# Patient Record
Sex: MALE | Race: OTHER | HISPANIC OR LATINO
[De-identification: names, ages, dates, MRNs, and addresses within clinical notes are randomized per-mention and may not be internally consistent; named-entity substitution may affect disease eponyms.]

---

## 2018-12-04 ENCOUNTER — TRANSCRIPTION ENCOUNTER (OUTPATIENT)
Age: 53
End: 2018-12-04

## 2018-12-10 ENCOUNTER — APPOINTMENT (OUTPATIENT)
Dept: PHYSICAL MEDICINE AND REHAB | Facility: CLINIC | Age: 53
End: 2018-12-10
Payer: COMMERCIAL

## 2018-12-10 VITALS
HEART RATE: 75 BPM | WEIGHT: 168 LBS | BODY MASS INDEX: 25.46 KG/M2 | HEIGHT: 68 IN | OXYGEN SATURATION: 90 % | RESPIRATION RATE: 16 BRPM

## 2018-12-10 DIAGNOSIS — M54.5 LOW BACK PAIN: ICD-10-CM

## 2018-12-10 DIAGNOSIS — Z82.62 FAMILY HISTORY OF OSTEOPOROSIS: ICD-10-CM

## 2018-12-10 DIAGNOSIS — Z86.39 PERSONAL HISTORY OF OTHER ENDOCRINE, NUTRITIONAL AND METABOLIC DISEASE: ICD-10-CM

## 2018-12-10 PROBLEM — Z00.00 ENCOUNTER FOR PREVENTIVE HEALTH EXAMINATION: Status: ACTIVE | Noted: 2018-12-10

## 2018-12-10 PROCEDURE — 99204 OFFICE O/P NEW MOD 45 MIN: CPT

## 2021-07-02 ENCOUNTER — TRANSCRIPTION ENCOUNTER (OUTPATIENT)
Age: 56
End: 2021-07-02

## 2021-08-13 ENCOUNTER — TRANSCRIPTION ENCOUNTER (OUTPATIENT)
Age: 56
End: 2021-08-13

## 2021-09-16 ENCOUNTER — APPOINTMENT (OUTPATIENT)
Dept: UROLOGY | Facility: CLINIC | Age: 56
End: 2021-09-16
Payer: MEDICAID

## 2021-09-16 ENCOUNTER — LABORATORY RESULT (OUTPATIENT)
Age: 56
End: 2021-09-16

## 2021-09-16 VITALS
HEART RATE: 81 BPM | TEMPERATURE: 98.7 F | OXYGEN SATURATION: 95 % | DIASTOLIC BLOOD PRESSURE: 76 MMHG | SYSTOLIC BLOOD PRESSURE: 128 MMHG

## 2021-09-16 DIAGNOSIS — C61 MALIGNANT NEOPLASM OF PROSTATE: ICD-10-CM

## 2021-09-16 PROCEDURE — 99204 OFFICE O/P NEW MOD 45 MIN: CPT

## 2021-09-16 NOTE — ASSESSMENT
[FreeTextEntry1] : Today we discussed the natural history of localized prostate cancer, and the heterogeneous biology of this malignancy.  We discussed the fact that many prostate cancers are slow growing and unlikely to metastasize or cause death, whereas others can be life threatening.  We reviewed risk stratification, staging, Murdock scoring, and PSA levels as they pertain to risk.  \par \par All treatment options were reviewed.  This included active surveillance, androgen deprivation, emerging options such as focal therapy/HIFU/cryotherapy, radiation options (including IMRT, SBRT, brachytherapy) and surgical options (open vs. robotic surgery, single port vs. multi, nerve vs. non-nerve sparing).  Oncologic outcomes were compared and contrasted.  Risks of biochemical and clinical recurrence discussed.  Risks of needing adjuvant or salvage treatments reviewed.  We discussed the risks of secondary malignancy after radiation.  We discussed the opportunity for pathological staging with surgery vs. other options.  We discussed the possibility of positive margins, treatment failure, cancer recurrence and cancer-related death even with treatment. \par \par All potential side effects of treatment were reviewed including, but not limited to: short term or permanent stress urinary incontinence and/or short term or permanent erectile dysfunction, penile shortening, rectal symptoms/pain, perineal pain, and other side effects. \par \par All potential complications of treatment and surgery were reviewed including, but not limited to: risks of conversion from MIS to open surgery discussed, bleeding//life-threatening hemorrhage, rectal injury requiring colostomy or delayed recognition leading to fistula, vascular/bowel/adjacent visceral organ injury, trocar/access injury, the possibility of recognized vs. unrecognized/delayed-recognition injury, risks of thermal/blunt/sharp/retraction injury, risks of DVT, PE, MI, death, risks of cardiopulmonary/anesthesia related complications, positional injury, infection/collection/abscess, wound complications/dehiscence/seroma/cellulitis, urinoma/fistula, ureteral injury/obstruction, bladder neck contracture, penile shortening, meatal stenosis, urethral stricture, lymphocele, obturator nerve injury, prolonged anastomotic leak, and other complications. \par \par We have discussed all of the above with  and they wish to move forward with robotic SP surgery.\par \par \par \par \par \par

## 2021-09-16 NOTE — HISTORY OF PRESENT ILLNESS
[FreeTextEntry1] : CC: prostate cancer\par \par H&P and PE with Samoan telephonic \par \par HPI: \par Patient is a 56 year old man, with elevated PSA 4.5 7/2021 s/p biopsy with GS 6 and 7 referred by Dr. Orville Gallagher. \par \par Patient had urosepsis after biopsy.  \par GS 3+4=7 2/12 (left lateral base and right medial apex) and GS 6 1/12 core (left apex, lateral) \par MRI with 44 c prostate, no definite lesions, MRI at R \par \par No burning or dysuria at this time.   AVSS today in office, afebrile \par Mild ED; erections are ~7 or 8/10 \par Not very sexually active with wife at this time \par \par FAMHX: negative breast, ovarian, prostate \par SURGHX: none \par MEDHX: HTN \par SOCIAL: , no children, nonsmoker \par ROS: Negative 10 system other than feeling a bit weak/tired \par

## 2021-09-17 LAB
ALBUMIN SERPL ELPH-MCNC: 4.7 G/DL
ALP BLD-CCNC: 115 U/L
ALT SERPL-CCNC: 41 U/L
ANION GAP SERPL CALC-SCNC: 20 MMOL/L
APTT BLD: 34.5 SEC
AST SERPL-CCNC: 19 U/L
BASOPHILS # BLD AUTO: 0 K/UL
BASOPHILS NFR BLD AUTO: 0 %
BILIRUB SERPL-MCNC: 1.4 MG/DL
BUN SERPL-MCNC: 17 MG/DL
CALCIUM SERPL-MCNC: 9.8 MG/DL
CHLORIDE SERPL-SCNC: 95 MMOL/L
CO2 SERPL-SCNC: 18 MMOL/L
CREAT SERPL-MCNC: 1.55 MG/DL
EOSINOPHIL # BLD AUTO: 0 K/UL
EOSINOPHIL NFR BLD AUTO: 0 %
GLUCOSE SERPL-MCNC: 163 MG/DL
HCT VFR BLD CALC: 44.9 %
HGB BLD-MCNC: 15.1 G/DL
INR PPP: 1.39 RATIO
LYMPHOCYTES # BLD AUTO: 5.17 K/UL
LYMPHOCYTES NFR BLD AUTO: 17.8 %
MAN DIFF?: NORMAL
MCHC RBC-ENTMCNC: 28.8 PG
MCHC RBC-ENTMCNC: 33.6 GM/DL
MCV RBC AUTO: 85.7 FL
MONOCYTES # BLD AUTO: 2.96 K/UL
MONOCYTES NFR BLD AUTO: 10.2 %
NEUTROPHILS # BLD AUTO: 20.9 K/UL
NEUTROPHILS NFR BLD AUTO: 72 %
PLATELET # BLD AUTO: 219 K/UL
POTASSIUM SERPL-SCNC: 4.1 MMOL/L
PROT SERPL-MCNC: 7.4 G/DL
PT BLD: 16.1 SEC
RBC # BLD: 5.24 M/UL
RBC # FLD: 14.6 %
SODIUM SERPL-SCNC: 133 MMOL/L
WBC # FLD AUTO: 29.03 K/UL

## 2021-09-20 DIAGNOSIS — N39.0 URINARY TRACT INFECTION, SITE NOT SPECIFIED: ICD-10-CM

## 2021-09-20 LAB
APPEARANCE: ABNORMAL
BACTERIA: ABNORMAL
BILIRUBIN URINE: NEGATIVE
BLOOD URINE: ABNORMAL
COLOR: ABNORMAL
GLUCOSE QUALITATIVE U: NEGATIVE
HYALINE CASTS: 0 /LPF
KETONES URINE: NEGATIVE
LEUKOCYTE ESTERASE URINE: ABNORMAL
MICROSCOPIC-UA: NORMAL
NITRITE URINE: NEGATIVE
PH URINE: 5.5
PROTEIN URINE: ABNORMAL
RED BLOOD CELLS URINE: 2 /HPF
SPECIFIC GRAVITY URINE: 1.01
SQUAMOUS EPITHELIAL CELLS: 0 /HPF
UROBILINOGEN URINE: NORMAL
WHITE BLOOD CELLS URINE: 32 /HPF

## 2021-09-21 ENCOUNTER — NON-APPOINTMENT (OUTPATIENT)
Age: 56
End: 2021-09-21

## 2021-09-21 ENCOUNTER — APPOINTMENT (OUTPATIENT)
Dept: UROLOGY | Facility: CLINIC | Age: 56
End: 2021-09-21
Payer: MEDICAID

## 2021-09-21 ENCOUNTER — INPATIENT (INPATIENT)
Facility: HOSPITAL | Age: 56
LOS: 2 days | Discharge: HOME | DRG: 690 | End: 2021-09-24
Attending: INTERNAL MEDICINE | Admitting: INTERNAL MEDICINE
Payer: COMMERCIAL

## 2021-09-21 VITALS
SYSTOLIC BLOOD PRESSURE: 155 MMHG | WEIGHT: 169 LBS | HEART RATE: 93 BPM | OXYGEN SATURATION: 99 % | DIASTOLIC BLOOD PRESSURE: 89 MMHG | TEMPERATURE: 97.6 F | BODY MASS INDEX: 25.61 KG/M2 | HEIGHT: 68 IN

## 2021-09-21 VITALS
TEMPERATURE: 98 F | SYSTOLIC BLOOD PRESSURE: 133 MMHG | DIASTOLIC BLOOD PRESSURE: 87 MMHG | HEIGHT: 68 IN | HEART RATE: 98 BPM | OXYGEN SATURATION: 98 % | WEIGHT: 169.09 LBS | RESPIRATION RATE: 18 BRPM

## 2021-09-21 DIAGNOSIS — Z86.79 PERSONAL HISTORY OF OTHER DISEASES OF THE CIRCULATORY SYSTEM: ICD-10-CM

## 2021-09-21 DIAGNOSIS — Z86.39 PERSONAL HISTORY OF OTHER ENDOCRINE, NUTRITIONAL AND METABOLIC DISEASE: ICD-10-CM

## 2021-09-21 DIAGNOSIS — I10 ESSENTIAL (PRIMARY) HYPERTENSION: ICD-10-CM

## 2021-09-21 DIAGNOSIS — Z00.00 ENCOUNTER FOR GENERAL ADULT MEDICAL EXAMINATION WITHOUT ABNORMAL FINDINGS: ICD-10-CM

## 2021-09-21 DIAGNOSIS — C61 MALIGNANT NEOPLASM OF PROSTATE: ICD-10-CM

## 2021-09-21 DIAGNOSIS — N39.0 URINARY TRACT INFECTION, SITE NOT SPECIFIED: ICD-10-CM

## 2021-09-21 LAB
ALBUMIN SERPL ELPH-MCNC: 4.4 G/DL — SIGNIFICANT CHANGE UP (ref 3.3–5)
ALP SERPL-CCNC: 87 U/L — SIGNIFICANT CHANGE UP (ref 40–120)
ALT FLD-CCNC: 28 U/L — SIGNIFICANT CHANGE UP (ref 10–45)
ANION GAP SERPL CALC-SCNC: 11 MMOL/L — SIGNIFICANT CHANGE UP (ref 5–17)
APPEARANCE UR: CLEAR — SIGNIFICANT CHANGE UP
AST SERPL-CCNC: 17 U/L — SIGNIFICANT CHANGE UP (ref 10–40)
BACTERIA # UR AUTO: ABNORMAL /HPF
BACTERIA UR CULT: ABNORMAL
BASOPHILS # BLD AUTO: 0.04 K/UL — SIGNIFICANT CHANGE UP (ref 0–0.2)
BASOPHILS NFR BLD AUTO: 0.4 % — SIGNIFICANT CHANGE UP (ref 0–2)
BILIRUB SERPL-MCNC: 0.3 MG/DL — SIGNIFICANT CHANGE UP (ref 0.2–1.2)
BILIRUB UR-MCNC: NEGATIVE — SIGNIFICANT CHANGE UP
BUN SERPL-MCNC: 20 MG/DL — SIGNIFICANT CHANGE UP (ref 7–23)
CALCIUM SERPL-MCNC: 10.3 MG/DL — SIGNIFICANT CHANGE UP (ref 8.4–10.5)
CHLORIDE SERPL-SCNC: 101 MMOL/L — SIGNIFICANT CHANGE UP (ref 96–108)
CO2 SERPL-SCNC: 26 MMOL/L — SIGNIFICANT CHANGE UP (ref 22–31)
COLOR SPEC: YELLOW — SIGNIFICANT CHANGE UP
COMMENT - URINE: SIGNIFICANT CHANGE UP
CREAT SERPL-MCNC: 1.27 MG/DL — SIGNIFICANT CHANGE UP (ref 0.5–1.3)
DIFF PNL FLD: ABNORMAL
EOSINOPHIL # BLD AUTO: 0.11 K/UL — SIGNIFICANT CHANGE UP (ref 0–0.5)
EOSINOPHIL NFR BLD AUTO: 1.1 % — SIGNIFICANT CHANGE UP (ref 0–6)
EPI CELLS # UR: SIGNIFICANT CHANGE UP /HPF (ref 0–5)
GLUCOSE SERPL-MCNC: 97 MG/DL — SIGNIFICANT CHANGE UP (ref 70–99)
GLUCOSE UR QL: NEGATIVE — SIGNIFICANT CHANGE UP
HCT VFR BLD CALC: 42.4 % — SIGNIFICANT CHANGE UP (ref 39–50)
HGB BLD-MCNC: 14.1 G/DL — SIGNIFICANT CHANGE UP (ref 13–17)
IMM GRANULOCYTES NFR BLD AUTO: 0.4 % — SIGNIFICANT CHANGE UP (ref 0–1.5)
KETONES UR-MCNC: NEGATIVE — SIGNIFICANT CHANGE UP
LACTATE SERPL-SCNC: 1.2 MMOL/L — SIGNIFICANT CHANGE UP (ref 0.5–2)
LEUKOCYTE ESTERASE UR-ACNC: ABNORMAL
LYMPHOCYTES # BLD AUTO: 1.82 K/UL — SIGNIFICANT CHANGE UP (ref 1–3.3)
LYMPHOCYTES # BLD AUTO: 18.7 % — SIGNIFICANT CHANGE UP (ref 13–44)
MCHC RBC-ENTMCNC: 28.3 PG — SIGNIFICANT CHANGE UP (ref 27–34)
MCHC RBC-ENTMCNC: 33.3 GM/DL — SIGNIFICANT CHANGE UP (ref 32–36)
MCV RBC AUTO: 85 FL — SIGNIFICANT CHANGE UP (ref 80–100)
MONOCYTES # BLD AUTO: 1.21 K/UL — HIGH (ref 0–0.9)
MONOCYTES NFR BLD AUTO: 12.4 % — SIGNIFICANT CHANGE UP (ref 2–14)
NEUTROPHILS # BLD AUTO: 6.52 K/UL — SIGNIFICANT CHANGE UP (ref 1.8–7.4)
NEUTROPHILS NFR BLD AUTO: 67 % — SIGNIFICANT CHANGE UP (ref 43–77)
NITRITE UR-MCNC: POSITIVE
NRBC # BLD: 0 /100 WBCS — SIGNIFICANT CHANGE UP (ref 0–0)
PH UR: 5.5 — SIGNIFICANT CHANGE UP (ref 5–8)
PLATELET # BLD AUTO: 278 K/UL — SIGNIFICANT CHANGE UP (ref 150–400)
POTASSIUM SERPL-MCNC: 4.4 MMOL/L — SIGNIFICANT CHANGE UP (ref 3.5–5.3)
POTASSIUM SERPL-SCNC: 4.4 MMOL/L — SIGNIFICANT CHANGE UP (ref 3.5–5.3)
PROT SERPL-MCNC: 8.6 G/DL — HIGH (ref 6–8.3)
PROT UR-MCNC: ABNORMAL MG/DL
RBC # BLD: 4.99 M/UL — SIGNIFICANT CHANGE UP (ref 4.2–5.8)
RBC # FLD: 13.3 % — SIGNIFICANT CHANGE UP (ref 10.3–14.5)
RBC CASTS # UR COMP ASSIST: ABNORMAL /HPF
SARS-COV-2 RNA SPEC QL NAA+PROBE: NEGATIVE — SIGNIFICANT CHANGE UP
SODIUM SERPL-SCNC: 138 MMOL/L — SIGNIFICANT CHANGE UP (ref 135–145)
SP GR SPEC: >=1.03 — SIGNIFICANT CHANGE UP (ref 1–1.03)
UROBILINOGEN FLD QL: 0.2 E.U./DL — SIGNIFICANT CHANGE UP
WBC # BLD: 9.74 K/UL — SIGNIFICANT CHANGE UP (ref 3.8–10.5)
WBC # FLD AUTO: 9.74 K/UL — SIGNIFICANT CHANGE UP (ref 3.8–10.5)
WBC UR QL: ABNORMAL /HPF

## 2021-09-21 PROCEDURE — 99213 OFFICE O/P EST LOW 20 MIN: CPT

## 2021-09-21 PROCEDURE — 99223 1ST HOSP IP/OBS HIGH 75: CPT | Mod: GC

## 2021-09-21 PROCEDURE — 71045 X-RAY EXAM CHEST 1 VIEW: CPT | Mod: 26

## 2021-09-21 PROCEDURE — 99285 EMERGENCY DEPT VISIT HI MDM: CPT

## 2021-09-21 RX ORDER — MEROPENEM 1 G/30ML
1000 INJECTION INTRAVENOUS EVERY 8 HOURS
Refills: 0 | Status: DISCONTINUED | OUTPATIENT
Start: 2021-09-21 | End: 2021-09-22

## 2021-09-21 RX ORDER — ENOXAPARIN SODIUM 100 MG/ML
40 INJECTION SUBCUTANEOUS EVERY 24 HOURS
Refills: 0 | Status: DISCONTINUED | OUTPATIENT
Start: 2021-09-21 | End: 2021-09-24

## 2021-09-21 RX ORDER — MEROPENEM 1 G/30ML
1000 INJECTION INTRAVENOUS EVERY 8 HOURS
Refills: 0 | Status: DISCONTINUED | OUTPATIENT
Start: 2021-09-22 | End: 2021-09-22

## 2021-09-21 RX ORDER — MEROPENEM 1 G/30ML
1000 INJECTION INTRAVENOUS ONCE
Refills: 0 | Status: COMPLETED | OUTPATIENT
Start: 2021-09-21 | End: 2021-09-21

## 2021-09-21 RX ADMIN — ENOXAPARIN SODIUM 40 MILLIGRAM(S): 100 INJECTION SUBCUTANEOUS at 21:33

## 2021-09-21 RX ADMIN — MEROPENEM 100 MILLIGRAM(S): 1 INJECTION INTRAVENOUS at 16:44

## 2021-09-21 NOTE — ED PROVIDER NOTE - CLINICAL SUMMARY MEDICAL DECISION MAKING FREE TEXT BOX
Pt referred to the ED for WBC and + UA. Afebrile in ED, VSS. No sig wbc or shift, neg lactate, however w/ recent UCx showing ESBL E coli, previously tx'd w/ Ertapenem. Will change to Meropenem for broader coverage, IVF, admit to medicine.

## 2021-09-21 NOTE — HISTORY OF PRESENT ILLNESS
[FreeTextEntry1] : 56 year old male here due to feeling unwell, is planned to have radical prostatectomy in October.\par Post biopsy infection, Was on Invanz x 3 weeks.\par \par Reports some nausea, feeling of "listless", dysuria, sense of incomplete emptying since he was seen in the office on 9/16/21.  Also reports chills last night\par \par \par Vitals in office WNL, PVR was 14 cc\par Was sent to PCP on Friday due to abnormal lab work, was cleared for surgery BUT no lab work repeated per patient.\par Does not appear acutely ill but given currently symptoms, lab work, and UCx would recommend ER for further evaluation.\par \par \par Patient taken to ER from office

## 2021-09-21 NOTE — ED PROVIDER NOTE - CARE PLAN
Principal Discharge DX:	Acute UTI  Secondary Diagnosis:	UTI due to extended-spectrum beta lactamase (ESBL) producing Escherichia coli   1

## 2021-09-21 NOTE — H&P ADULT - PROBLEM SELECTOR PLAN 5
F: None   E: Replete as necessary K>4 Mg>2  N: Regular diet     DVT Prophylaxis: Lovenox 40mg daily   GI prophylaxis: None   CODE STATUS: FULL

## 2021-09-21 NOTE — H&P ADULT - PROBLEM SELECTOR PLAN 1
Prostate adenoCA disgnosed via Bx, subsequent previous admission for sepsis 2/2 UTI, treated w Ertapenem. Had cloudy urine at pre-op clearance visit given, TMP--160mg BID x 7days however couldn't tolerated d/t vomiting. Later found to have WBC 29 & +UA w/ ESBL E Coli, and advised to go to the ED s/p Meropenem 1g.  - Pending UCx, BCx  - Uro saw pt no intervention at this time  - Consider ID consult Prostate adenoCA disgnosed via Bx, subsequent previous admission for sepsis 2/2 UTI, treated w Ertapenem. Had cloudy urine at pre-op clearance visit given, TMP--160mg BID x 7days however couldn't tolerated d/t vomiting. Later found to have WBC 29 & +UA w/ ESBL E Coli, and advised to go to the ED s/p Meropenem 1g.  - Uro saw pt in ED no intervention at this time  - c/w IV Meropenem 1g q8h, until cultures result, if negative switch to PO abx, if positive get ID approval for meropenem  - bladder scan q6h  - f/u UCx, BCx Prostate adenoCA disgnosed via Bx, subsequent previous admission for sepsis 2/2 UTI, treated w Ertapenem. Had cloudy urine at pre-op clearance visit given, TMP--160mg BID x 7days however couldn't tolerated d/t vomiting. Later found to have WBC 29 & +UA w/ ESBL E Coli, and advised to go to the ED s/p Meropenem 1g.  - Uro saw pt in ED no intervention at this time  - c/w IV Meropenem 1g q8h, until cultures result, if negative switch to PO abx, if positive get ID approval for meropenem  - bladder scan q8h  - f/u UCx, BCx Prostate adenoCA disgnosed via Bx, subsequent previous admission for sepsis 2/2 UTI, treated w Ertapenem. Had cloudy urine at pre-op clearance visit given, TMP--160mg BID x 7days however couldn't tolerated d/t vomiting. Later found to have WBC 29 & +UA w/ ESBL E Coli, and advised to go to the ED s/p Meropenem 1g.  - ED UA: Trace protein,+ Nitrites, +LE, Large blood, Many WBCs, 5-10 RBCs, Many bacteria  - Uro saw pt in ED no intervention at this time  - c/w IV Meropenem 1g q8h, until cultures result, if negative switch to PO abx, if positive get ID approval for meropenem  - f/u UCx, BCx  - bladder scan q8h, can discontinue later if not retaining

## 2021-09-21 NOTE — H&P ADULT - PROBLEM SELECTOR PLAN 2
Recent diagnosis of prostate adenoCA on 8/10. Scheduled for prostate resection surgery on 10/11  - f/u OP w Dr. Oni Michael

## 2021-09-21 NOTE — ED ADULT NURSE NOTE - CHIEF COMPLAINT QUOTE
sent in by MD perez. [facility] of prostate ca, has WBC count of 29,000.  reports headaches, chills and fevers x 1 week.

## 2021-09-21 NOTE — H&P ADULT - NSHPSOURCEINFORD_GEN_ALL_CORE
Patient/Other Family Member I have personally performed a face to face diagnostic evaluation on this patient. I have reviewed the ACP note and agree with the history, exam and plan of care, except as noted.

## 2021-09-21 NOTE — ED PROVIDER NOTE - PROGRESS NOTE DETAILS
Urology consulted and will see the pt D/w Urology - no acute uro intervention. Admit to medicine for abx

## 2021-09-21 NOTE — H&P ADULT - NSHPSOCIALHISTORY_GEN_ALL_CORE
currently unemployed, used to work in sales at [employer]  never smoker, social alcohol

## 2021-09-21 NOTE — ED PROVIDER NOTE - NS ED ROS FT
Constitutional: No fever or chills.   Eyes: No pain, blurry vision, or discharge.  ENMT: No hearing changes, pain, discharge or infections. No neck pain or stiffness.  Cardiac: No chest pain, SOB or edema. No chest pain with exertion.  Respiratory: No cough or respiratory distress. No hemoptysis. No history of asthma or RAD.  GI: No nausea, vomiting, diarrhea or abdominal pain.  : See HPI  MS: No myalgia, muscle weakness, joint pain or back pain.  Neuro: No headache or weakness. No LOC.  Skin: No skin rash.   Endocrine: No history of thyroid disease or diabetes.  Except as documented in the HPI, all other systems are negative.

## 2021-09-21 NOTE — ED ADULT TRIAGE NOTE - CHIEF COMPLAINT QUOTE
sent in by MD perez. [facility] of prostate ca, has WBC count of 29,000.  reports headaches, chills and fevers x 1 week.

## 2021-09-21 NOTE — ED PROVIDER NOTE - PHYSICAL EXAMINATION
Constitutional: Well appearing, awake, alert, oriented to person, place, time/situation and in no apparent distress. non toxic appearing   ENMT: Airway patent. Normal MM  Eyes: Clear bilaterally  Cardiac: Normal rate, regular rhythm.  Heart sounds S1, S2.  No murmurs, rubs or gallops.  Respiratory: Breaths sounds equal and clear b/l. No increased WOB, tachypnea, hypoxia, or accessory mm use. Pt speaks in full sentences.   Gastrointestinal: Abd soft, NT, ND, NABS. No guarding, rebound, or rigidity. No pulsatile abdominal masses. No organomegaly appreciated. No CVAT. no bladder distention  Musculoskeletal: Range of motion is not limited  Neuro: Alert and oriented x 3, face symmetric and speech fluent. Strength 5/5 x 4 ext and symmetric, nml gross motor movement, nml gait. No focal deficits noted.  Skin: Skin normal color for race, warm, dry and intact. No evidence of rash.  Psych: Alert and oriented to person, place, time/situation. normal mood and affect. no apparent risk to self or others.

## 2021-09-21 NOTE — ED PROVIDER NOTE - OBJECTIVE STATEMENT
Pt w/ PMHx HTN (not currently on meds), recent diagnosis of prostate CA (not currently on tx), referred to the ED by Dr Michael (Urology) for urosepsis. Pt had prostate bx w/ a Dr Orville Gallagher on 8/10. Pt diagnosed w/ adenoCA of the prostate. On 8/16 he presented to the ED at [facility] and dx'd w/ Urosepsis. Pt had a PICC line placed and was on Ertapenem 1 g Q 24 until 9/8 (last dose). He saw Dr Michael for the first time on 9/16 had pre-op labs / ua / ucx. Results today from 9/16 revealed WBC 29, and + UA w/ ESBL E Coli (see HIE tab). Pt reports 24 hours of feeling hot and cold w/ sweats. He reports some urinary frequency and hesitancy. He denies known fever. No flank pain or abd pain. He states he vomited once several days ago. No other infectious sx. Pt is fully vaccinated against COVID19

## 2021-09-21 NOTE — ED ADULT NURSE NOTE - OBJECTIVE STATEMENT
Patient alert and oriented x 3 came was sent by Dr. Bunn for abnormal labs with wbc of 29,000 from [facility] done last week ., Pt. has history of prostate cancer , not on chemo nor radiation at this time . PT. reported had IV ABT for 3 weeks for UTI last dose was sept 7th . Pt. c/o urinary frequency for few days , denies any dysuria , fever , chills , abdominal pain at this time , Seen and examined by Dr. Mcdaniel , all labs sent . safety maintained .

## 2021-09-21 NOTE — H&P ADULT - HISTORY OF PRESENT ILLNESS
HPI: 53M PMHx HTN & HLD (non-compliant w meds), recent diagnosis of prostate CA on 8/10, admitted to [facility] on 8/16 for sepsis 2/2 UTI, had a PICC line placed and was on Ertapenem 1g q24h until 9/8. Currently scheduled for prostate resection surgery on 10/11. For pre-op clearance visited his Urologist Dr. Oni Michael on 9/16 for labs, started on TMP--160mg BID x7days. Today 9/21, labs resulted significant for WBC 29 & +UA w/ ESBL E Coli, and advised to go to the ED. Symptomatically patient has dysuria, urinary urgency & frequency voiding small volumes at time. Denies fevers, flank or abdominal pain. Yesterday he had night sweats    Dr. Oni Michael referred to the ED by Dr Michael (Urology) for urosepsis. Pt had prostate bx w/ a Dr Orville Gallagher on 8/10. Pt diagnosed w/ adenoCA of the prostate. On 8/16 he presented to the ED at [facility] and dx'd w/ Urosepsis. Pt had a PICC line placed and was on Ertapenem 1 g Q 24 until 9/8 (last dose). He saw Dr Michael for the first time on 9/16 had pre-op labs / ua / ucx. Results today from 9/16 revealed WBC 29, and + UA w/ ESBL E Coli (see HIE tab). Pt reports 24 hours of feeling hot and cold w/ sweats. He reports some urinary frequency and hesitancy. He denies known fever. No flank pain or abd pain. He states he vomited once several days ago. No other infectious sx. Pt is fully vaccinated against COVID19    In the ED,  VS: T 98.2F 36.8C, HR 76, /74, RR 18, SpO2 98% RA  Labs: WNL  UA: Trace protein,+ Nitrites, +LE, Large blood, Many WBCs, 5-10 RBCs, Many bacteria  EKG: NSR, QTc 413  CXR: NAD  Imaging: Other  Orders: Meropenem 1g   HPI: 53M PMHx HTN & HLD (non-compliant w meds), recent diagnosis of prostate adenoCA via Bx by Dr Orville Gallagher on 8/10, admitted to [facility] on 8/16 for sepsis 2/2 UTI, had a PICC line placed and was on Ertapenem 1g q24h until 9/8. Currently scheduled for prostate resection surgery on 10/11. For pre-op clearance visited his Urologist Dr. Oni Michael on 9/16 for labs, started on TMP--160mg BID x7days. Today 9/21, labs resulted significant for WBC 29 & +UA w/ ESBL E Coli, and advised to go to the ED. Symptomatically patient has dysuria, urinary urgency & frequency voiding small volumes at time. Denies fevers, flank or abdominal pain. Yesterday he report new onset night sweats.    In the ED,  VS: T 98.2F 36.8C, HR 76, /74, RR 18, SpO2 98% RA  Labs: WNL  UA: Trace protein,+ Nitrites, +LE, Large blood, Many WBCs, 5-10 RBCs, Many bacteria  EKG: NSR, QTc 413  CXR: NAD  Imaging: Other  Orders: Meropenem 1g   HPI: 53M PMHx HTN & HLD (non-compliant w meds), recent diagnosis of prostate adenoCA via Bx by Dr Orville Gallagher on 8/10, admitted to [facility] on 8/16 for sepsis 2/2 UTI, had a PICC line placed and was on Ertapenem 1g q24h until 9/8. Currently scheduled for prostate resection surgery on 10/11. For pre-op clearance visited his Urologist Dr. Oni Michael on 9/16 for labs, started on TMP--160mg BID x7days. Today 9/21, labs resulted significant for WBC 29 & +UA w/ ESBL E Coli, and advised to go to the ED. Symptomatically patient has dysuria, urinary urgency & frequency voiding small volumes at time. Denies fevers, flank or abdominal pain. Yesterday he report new onset night sweats.    In the ED,  VS: T 98.2F 36.8C, HR 76, /74, RR 18, SpO2 98% RA  Labs: WNL  UA: Trace protein,+ Nitrites, +LE, Large blood, Many WBCs, 5-10 RBCs, Many bacteria  EKG: NSR, QTc 413  CXR: NAD  Orders: Meropenem 1g

## 2021-09-21 NOTE — H&P ADULT - NSHPLABSRESULTS_GEN_ALL_CORE
LABS:                         14.1   9.74  )-----------( 278      ( 21 Sep 2021 13:49 )             42.4     09-21    138  |  101  |  20  ----------------------------<  97  4.4   |  26  |  1.27    Ca    10.3      21 Sep 2021 13:48    TPro  8.6<H>  /  Alb  4.4  /  TBili  0.3  /  DBili  x   /  AST  17  /  ALT  28  /  AlkPhos  87  09-21    Urinalysis Basic - ( 21 Sep 2021 14:30 )    Color: Yellow / Appearance: Clear / SG: >=1.030 / pH: x  Gluc: x / Ketone: NEGATIVE  / Bili: Negative / Urobili: 0.2 E.U./dL   Blood: x / Protein: Trace mg/dL / Nitrite: POSITIVE   Leuk Esterase: Moderate / RBC: 5-10 /HPF / WBC Many /HPF   Sq Epi: x / Non Sq Epi: 0-5 /HPF / Bacteria: Many /HPF    Lactate, Blood: 1.2 mmol/L (09-21 @ 13:47)    RADIOLOGY, EKG & ADDITIONAL TESTS:

## 2021-09-21 NOTE — H&P ADULT - ATTENDING COMMENTS
53M PMHx HTN & HLD (non-compliant w meds), recent diagnosis of prostate adenoCA, and ESBL Ecoli UTI; presents to ED w/ abnormal outpt labs of WBC 29k and symptoms of fever, dysuria for one week.       #UTI: Elevated WBC on Outpt labs and urine cx+ ESBL Ecoli on 9/16. +subjective fevers however afebrile in ED and no leukocytosis; UA positive. No signs of pyelo; kidney function improved. s/p meropenem in ED, continue for now; f/up blood/urine cx. Can transition to PO tomm based on sensitivities. Reach out to urologist Dr. Michael in AM. Monitor urine output.

## 2021-09-21 NOTE — H&P ADULT - ASSESSMENT
HPI: 53M PMHx HTN & HLD (non-compliant w meds), recent diagnosis of prostate adenoCA via Bx, admitted to [facility] on 8/16 for sepsis 2/2 UTI, treated w Ertapenem. Scheduled for prostate resection surgery on 10/11. Had cloudy urine at pre-op clearance visit given, TMP--160mg BID x7days however couldnt tolrerated d/t vomiting.  When labs resulted, found to have WBC 29 & +UA w/ ESBL E Coli, and advised to go to the ED, admitted for Abx therapy.

## 2021-09-21 NOTE — H&P ADULT - NSHPPHYSICALEXAM_GEN_ALL_CORE
VITAL SIGNS:  T(C): 36.7 (09-21-21 @ 18:03), Max: 36.8 (09-21-21 @ 12:51)  T(F): 98.1 (09-21-21 @ 18:03), Max: 98.3 (09-21-21 @ 12:51)  HR: 82 (09-21-21 @ 18:03) (76 - 98)  BP: 154/67 (09-21-21 @ 18:03) (122/74 - 154/67)  BP(mean): --  RR: 18 (09-21-21 @ 18:03) (18 - 18)  SpO2: 98% (09-21-21 @ 18:03) (98% - 98%)  Wt(kg): --    PHYSICAL EXAM:    Constitutional: WDWN resting comfortably in bed; NAD  Head: NC/AT  Eyes: PERRL, EOMI, clear conjunctiva  ENT: no nasal discharge; uvula midline, no oropharyngeal erythema or exudates; MMM  Neck: supple; no JVD or thyromegaly  Respiratory: CTA B/L; no W/R/R, no retractions  Cardiac: +S1/S2; RRR; no M/R/G;  Gastrointestinal: soft, NT/ND; no rebound or guarding; +BSx4  Extremities: WWP, no clubbing or cyanosis; no peripheral edema  Musculoskeletal: NROM x4; no joint swelling, tenderness or erythema  Vascular: 2+ radial, femoral, DP/PT pulses B/L  Dermatologic: skin warm, dry and intact; no rashes, wounds, or scars  Neurologic: AAOx3; CNII-XII grossly intact; no focal deficits  Psychiatric: affect and characteristics of appearance, verbalizations, behaviors are appropriate

## 2021-09-22 PROBLEM — C61 MALIGNANT NEOPLASM OF PROSTATE: Chronic | Status: ACTIVE | Noted: 2021-09-21

## 2021-09-22 LAB
ANION GAP SERPL CALC-SCNC: 11 MMOL/L — SIGNIFICANT CHANGE UP (ref 5–17)
BASOPHILS # BLD AUTO: 0.03 K/UL — SIGNIFICANT CHANGE UP (ref 0–0.2)
BASOPHILS NFR BLD AUTO: 0.4 % — SIGNIFICANT CHANGE UP (ref 0–2)
BUN SERPL-MCNC: 21 MG/DL — SIGNIFICANT CHANGE UP (ref 7–23)
CALCIUM SERPL-MCNC: 9.7 MG/DL — SIGNIFICANT CHANGE UP (ref 8.4–10.5)
CHLORIDE SERPL-SCNC: 101 MMOL/L — SIGNIFICANT CHANGE UP (ref 96–108)
CO2 SERPL-SCNC: 27 MMOL/L — SIGNIFICANT CHANGE UP (ref 22–31)
CREAT SERPL-MCNC: 1.4 MG/DL — HIGH (ref 0.5–1.3)
EOSINOPHIL # BLD AUTO: 0.08 K/UL — SIGNIFICANT CHANGE UP (ref 0–0.5)
EOSINOPHIL NFR BLD AUTO: 1.1 % — SIGNIFICANT CHANGE UP (ref 0–6)
GLUCOSE SERPL-MCNC: 127 MG/DL — HIGH (ref 70–99)
HCT VFR BLD CALC: 40.9 % — SIGNIFICANT CHANGE UP (ref 39–50)
HCV AB S/CO SERPL IA: 0.04 S/CO — SIGNIFICANT CHANGE UP
HCV AB SERPL-IMP: SIGNIFICANT CHANGE UP
HGB BLD-MCNC: 13.7 G/DL — SIGNIFICANT CHANGE UP (ref 13–17)
IMM GRANULOCYTES NFR BLD AUTO: 0.3 % — SIGNIFICANT CHANGE UP (ref 0–1.5)
LYMPHOCYTES # BLD AUTO: 2.08 K/UL — SIGNIFICANT CHANGE UP (ref 1–3.3)
LYMPHOCYTES # BLD AUTO: 28.9 % — SIGNIFICANT CHANGE UP (ref 13–44)
MCHC RBC-ENTMCNC: 28.1 PG — SIGNIFICANT CHANGE UP (ref 27–34)
MCHC RBC-ENTMCNC: 33.5 GM/DL — SIGNIFICANT CHANGE UP (ref 32–36)
MCV RBC AUTO: 83.8 FL — SIGNIFICANT CHANGE UP (ref 80–100)
MONOCYTES # BLD AUTO: 0.91 K/UL — HIGH (ref 0–0.9)
MONOCYTES NFR BLD AUTO: 12.6 % — SIGNIFICANT CHANGE UP (ref 2–14)
NEUTROPHILS # BLD AUTO: 4.08 K/UL — SIGNIFICANT CHANGE UP (ref 1.8–7.4)
NEUTROPHILS NFR BLD AUTO: 56.7 % — SIGNIFICANT CHANGE UP (ref 43–77)
NRBC # BLD: 0 /100 WBCS — SIGNIFICANT CHANGE UP (ref 0–0)
PLATELET # BLD AUTO: 323 K/UL — SIGNIFICANT CHANGE UP (ref 150–400)
POTASSIUM SERPL-MCNC: 4.3 MMOL/L — SIGNIFICANT CHANGE UP (ref 3.5–5.3)
POTASSIUM SERPL-SCNC: 4.3 MMOL/L — SIGNIFICANT CHANGE UP (ref 3.5–5.3)
RBC # BLD: 4.88 M/UL — SIGNIFICANT CHANGE UP (ref 4.2–5.8)
RBC # FLD: 13.2 % — SIGNIFICANT CHANGE UP (ref 10.3–14.5)
SODIUM SERPL-SCNC: 139 MMOL/L — SIGNIFICANT CHANGE UP (ref 135–145)
WBC # BLD: 7.2 K/UL — SIGNIFICANT CHANGE UP (ref 3.8–10.5)
WBC # FLD AUTO: 7.2 K/UL — SIGNIFICANT CHANGE UP (ref 3.8–10.5)

## 2021-09-22 PROCEDURE — 99232 SBSQ HOSP IP/OBS MODERATE 35: CPT

## 2021-09-22 PROCEDURE — 99222 1ST HOSP IP/OBS MODERATE 55: CPT

## 2021-09-22 RX ORDER — ERTAPENEM SODIUM 1 G/1
1000 INJECTION, POWDER, LYOPHILIZED, FOR SOLUTION INTRAMUSCULAR; INTRAVENOUS EVERY 24 HOURS
Refills: 0 | Status: DISCONTINUED | OUTPATIENT
Start: 2021-09-22 | End: 2021-09-24

## 2021-09-22 RX ADMIN — MEROPENEM 100 MILLIGRAM(S): 1 INJECTION INTRAVENOUS at 09:33

## 2021-09-22 RX ADMIN — ERTAPENEM SODIUM 120 MILLIGRAM(S): 1 INJECTION, POWDER, LYOPHILIZED, FOR SOLUTION INTRAMUSCULAR; INTRAVENOUS at 17:32

## 2021-09-22 RX ADMIN — ENOXAPARIN SODIUM 40 MILLIGRAM(S): 100 INJECTION SUBCUTANEOUS at 22:03

## 2021-09-22 NOTE — PROGRESS NOTE ADULT - SUBJECTIVE AND OBJECTIVE BOX
OVERNIGHT EVENTS: Afebrile, VSS    SUBJECTIVE / INTERVAL HPI: Patient seen and examined at bedside. Denied f/c. Denied dysuria. States urinary frequency has reduced.  No complaints    VITAL SIGNS:  Vital Signs Last 24 Hrs  T(C): 36.7 (22 Sep 2021 09:14), Max: 36.9 (22 Sep 2021 00:23)  T(F): 98 (22 Sep 2021 09:14), Max: 98.5 (22 Sep 2021 00:23)  HR: 88 (22 Sep 2021 09:14) (76 - 98)  BP: 122/80 (22 Sep 2021 09:14) (115/72 - 154/67)  BP(mean): --  RR: 18 (22 Sep 2021 09:14) (16 - 18)  SpO2: 97% (22 Sep 2021 09:14) (97% - 98%)    PHYSICAL EXAM:    General: NAD  HEENT: NC/AT; PERRL, anicteric sclera; MMM  Neck: supple  Cardiovascular: +S1/S2, RRR, no murmurs, rubs, gallops  Respiratory: CTA B/L; no W/R/R  Gastrointestinal: soft, NT/ND; +BSx4. No suprapubic tenderness  Extremities: WWP; no edema, clubbing or cyanosis  Vascular: 2+ radial, DP/PT pulses B/L  Neurological: AAOx3; no focal deficits    MEDICATIONS:  MEDICATIONS  (STANDING):  enoxaparin Injectable 40 milliGRAM(s) SubCutaneous every 24 hours  meropenem  IVPB 1000 milliGRAM(s) IV Intermittent every 8 hours    MEDICATIONS  (PRN):      ALLERGIES:  Allergies    No Known Allergies    Intolerances        LABS:                        14.1   9.74  )-----------( 278      ( 21 Sep 2021 13:49 )             42.4     09-22    139  |  101  |  21  ----------------------------<  127<H>  4.3   |  27  |  1.40<H>    Ca    9.7      22 Sep 2021 06:06    TPro  8.6<H>  /  Alb  4.4  /  TBili  0.3  /  DBili  x   /  AST  17  /  ALT  28  /  AlkPhos  87  09-21      Urinalysis Basic - ( 21 Sep 2021 14:30 )    Color: Yellow / Appearance: Clear / SG: >=1.030 / pH: x  Gluc: x / Ketone: NEGATIVE  / Bili: Negative / Urobili: 0.2 E.U./dL   Blood: x / Protein: Trace mg/dL / Nitrite: POSITIVE   Leuk Esterase: Moderate / RBC: 5-10 /HPF / WBC Many /HPF   Sq Epi: x / Non Sq Epi: 0-5 /HPF / Bacteria: Many /HPF      CAPILLARY BLOOD GLUCOSE          RADIOLOGY & ADDITIONAL TESTS: Reviewed.    PLAN:

## 2021-09-22 NOTE — PROGRESS NOTE ADULT - ASSESSMENT
HPI: 53M PMHx HTN HLD recent diagnosis of prostate adenoCA via Bx, admitted to [facility] on 8/16 for sepsis 2/2 UTI, treated w Ertapenem. Scheduled for prostate resection surgery on 10/11 with preop clearance labs showing cloudy urine for which started on Bactrim but did not tolerate d/t n/v, UA resulted in WBC 29 & +UA w/ ESBL E Coli sensitive and advised to go to the ED with a positive UA admitted for Abx therapy.

## 2021-09-22 NOTE — CONSULT NOTE ADULT - ATTENDING COMMENTS
Agree with above. I am concerned about prostatitis +/- prostate abscess given the recurrent nature of the ESBL E. Coli.  CT abd/pelvis will help assess for this.  Continue Ertapenem for now

## 2021-09-22 NOTE — PROGRESS NOTE ADULT - PROBLEM SELECTOR PLAN 1
Prostate adenoCA disgnosed via Bx, subsequent previous admission for sepsis 2/2 UTI, treated w Ertapenem. Had cloudy urine at pre-op clearance visit given, TMP--160mg BID x 7days however couldn't tolerated d/t vomiting. Later found to have WBC 29 & +UA w/ ESBL E Coli, and advised to go to the ED s/p Meropenem 1g.  - ED UA: Trace protein,+ Nitrites, +LE, Large blood, Many WBCs, 5-10 RBCs, Many bacteria  - Uro saw pt in ED no intervention at this time  - c/w IV Meropenem 1g q8h  -Appreciate ID recs  - f/u UCx, BCx  - bladder scan q8h, can discontinue later if not retaining

## 2021-09-23 LAB
-  AMIKACIN: SIGNIFICANT CHANGE UP
-  AMPICILLIN/SULBACTAM: SIGNIFICANT CHANGE UP
-  AMPICILLIN: SIGNIFICANT CHANGE UP
-  CEFAZOLIN: SIGNIFICANT CHANGE UP
-  CEFTRIAXONE: SIGNIFICANT CHANGE UP
-  CIPROFLOXACIN: SIGNIFICANT CHANGE UP
-  ERTAPENEM: SIGNIFICANT CHANGE UP
-  GENTAMICIN: SIGNIFICANT CHANGE UP
-  MEROPENEM: SIGNIFICANT CHANGE UP
-  NITROFURANTOIN: SIGNIFICANT CHANGE UP
-  PIPERACILLIN/TAZOBACTAM: SIGNIFICANT CHANGE UP
-  TOBRAMYCIN: SIGNIFICANT CHANGE UP
-  TRIMETHOPRIM/SULFAMETHOXAZOLE: SIGNIFICANT CHANGE UP
ANION GAP SERPL CALC-SCNC: 9 MMOL/L — SIGNIFICANT CHANGE UP (ref 5–17)
BASOPHILS # BLD AUTO: 0.03 K/UL — SIGNIFICANT CHANGE UP (ref 0–0.2)
BASOPHILS NFR BLD AUTO: 0.5 % — SIGNIFICANT CHANGE UP (ref 0–2)
BUN SERPL-MCNC: 17 MG/DL — SIGNIFICANT CHANGE UP (ref 7–23)
CALCIUM SERPL-MCNC: 9.9 MG/DL — SIGNIFICANT CHANGE UP (ref 8.4–10.5)
CHLORIDE SERPL-SCNC: 100 MMOL/L — SIGNIFICANT CHANGE UP (ref 96–108)
CO2 SERPL-SCNC: 29 MMOL/L — SIGNIFICANT CHANGE UP (ref 22–31)
COVID-19 SPIKE DOMAIN AB INTERP: POSITIVE
COVID-19 SPIKE DOMAIN ANTIBODY RESULT: >250 U/ML — HIGH
CREAT SERPL-MCNC: 1.39 MG/DL — HIGH (ref 0.5–1.3)
CULTURE RESULTS: SIGNIFICANT CHANGE UP
EOSINOPHIL # BLD AUTO: 0.13 K/UL — SIGNIFICANT CHANGE UP (ref 0–0.5)
EOSINOPHIL NFR BLD AUTO: 2 % — SIGNIFICANT CHANGE UP (ref 0–6)
GLUCOSE SERPL-MCNC: 115 MG/DL — HIGH (ref 70–99)
HCT VFR BLD CALC: 40.9 % — SIGNIFICANT CHANGE UP (ref 39–50)
HGB BLD-MCNC: 13.4 G/DL — SIGNIFICANT CHANGE UP (ref 13–17)
IMM GRANULOCYTES NFR BLD AUTO: 0.5 % — SIGNIFICANT CHANGE UP (ref 0–1.5)
LYMPHOCYTES # BLD AUTO: 1.91 K/UL — SIGNIFICANT CHANGE UP (ref 1–3.3)
LYMPHOCYTES # BLD AUTO: 28.8 % — SIGNIFICANT CHANGE UP (ref 13–44)
MAGNESIUM SERPL-MCNC: 2.2 MG/DL — SIGNIFICANT CHANGE UP (ref 1.6–2.6)
MCHC RBC-ENTMCNC: 27.5 PG — SIGNIFICANT CHANGE UP (ref 27–34)
MCHC RBC-ENTMCNC: 32.8 GM/DL — SIGNIFICANT CHANGE UP (ref 32–36)
MCV RBC AUTO: 84 FL — SIGNIFICANT CHANGE UP (ref 80–100)
METHOD TYPE: SIGNIFICANT CHANGE UP
MONOCYTES # BLD AUTO: 0.79 K/UL — SIGNIFICANT CHANGE UP (ref 0–0.9)
MONOCYTES NFR BLD AUTO: 11.9 % — SIGNIFICANT CHANGE UP (ref 2–14)
NEUTROPHILS # BLD AUTO: 3.74 K/UL — SIGNIFICANT CHANGE UP (ref 1.8–7.4)
NEUTROPHILS NFR BLD AUTO: 56.3 % — SIGNIFICANT CHANGE UP (ref 43–77)
NRBC # BLD: 0 /100 WBCS — SIGNIFICANT CHANGE UP (ref 0–0)
ORGANISM # SPEC MICROSCOPIC CNT: SIGNIFICANT CHANGE UP
ORGANISM # SPEC MICROSCOPIC CNT: SIGNIFICANT CHANGE UP
PHOSPHATE SERPL-MCNC: 3.7 MG/DL — SIGNIFICANT CHANGE UP (ref 2.5–4.5)
PLATELET # BLD AUTO: 335 K/UL — SIGNIFICANT CHANGE UP (ref 150–400)
POTASSIUM SERPL-MCNC: 4.6 MMOL/L — SIGNIFICANT CHANGE UP (ref 3.5–5.3)
POTASSIUM SERPL-SCNC: 4.6 MMOL/L — SIGNIFICANT CHANGE UP (ref 3.5–5.3)
PSA FLD-MCNC: 5.86 NG/ML — HIGH (ref 0–4)
RBC # BLD: 4.87 M/UL — SIGNIFICANT CHANGE UP (ref 4.2–5.8)
RBC # FLD: 13.1 % — SIGNIFICANT CHANGE UP (ref 10.3–14.5)
SARS-COV-2 IGG+IGM SERPL QL IA: >250 U/ML — HIGH
SARS-COV-2 IGG+IGM SERPL QL IA: POSITIVE
SODIUM SERPL-SCNC: 138 MMOL/L — SIGNIFICANT CHANGE UP (ref 135–145)
SPECIMEN SOURCE: SIGNIFICANT CHANGE UP
WBC # BLD: 6.63 K/UL — SIGNIFICANT CHANGE UP (ref 3.8–10.5)
WBC # FLD AUTO: 6.63 K/UL — SIGNIFICANT CHANGE UP (ref 3.8–10.5)

## 2021-09-23 PROCEDURE — 99232 SBSQ HOSP IP/OBS MODERATE 35: CPT | Mod: GC

## 2021-09-23 PROCEDURE — 74177 CT ABD & PELVIS W/CONTRAST: CPT | Mod: 26

## 2021-09-23 PROCEDURE — 99233 SBSQ HOSP IP/OBS HIGH 50: CPT | Mod: GC

## 2021-09-23 RX ADMIN — ERTAPENEM SODIUM 120 MILLIGRAM(S): 1 INJECTION, POWDER, LYOPHILIZED, FOR SOLUTION INTRAMUSCULAR; INTRAVENOUS at 16:55

## 2021-09-23 RX ADMIN — ENOXAPARIN SODIUM 40 MILLIGRAM(S): 100 INJECTION SUBCUTANEOUS at 21:16

## 2021-09-23 NOTE — PROGRESS NOTE ADULT - SUBJECTIVE AND OBJECTIVE BOX
INFECTIOUS DISEASES FOLLOW UP    This is a follow up note for this  56yMale with ESBL EColi UTI, cannot rule out prostatitis vs prostate abscess.     update/ S:  used. Patient feels well today and states his symptoms of pain with urination and increased frequency are improving.     ROS:  negative except as above    Allergies    No Known Allergies    Intolerances        ANTIBIOTICS/RELEVANT:  antimicrobials  ertapenem  IVPB 1000 milliGRAM(s) IV Intermittent every 24 hours    immunologic:    OTHER:  enoxaparin Injectable 40 milliGRAM(s) SubCutaneous every 24 hours      Objective:  Vital Signs Last 24 Hrs  T(C): 36.4 (23 Sep 2021 10:01), Max: 36.5 (22 Sep 2021 21:39)  T(F): 97.6 (23 Sep 2021 10:01), Max: 97.7 (22 Sep 2021 21:39)  HR: 78 (23 Sep 2021 10:01) (67 - 78)  BP: 113/74 (23 Sep 2021 10:01) (113/74 - 124/80)  BP(mean): --  RR: 16 (23 Sep 2021 10:01) (16 - 18)  SpO2: 97% (23 Sep 2021 10:01) (96% - 98%)    PHYSICAL EXAM:  General: resting in bed in NAD   HEENT: no abnormalities   Heart: RRR, no m/r/g  Lungs: CTAB   Abdomen: soft, non-tender, non-distended. No CVAT, no suprapubic tenderness.   Extremities: WWP     LABS:                        13.4   6.63  )-----------( 335      ( 23 Sep 2021 07:14 )             40.9     09-23    138  |  100  |  17  ----------------------------<  115<H>  4.6   |  29  |  1.39<H>    Ca    9.9      23 Sep 2021 07:14  Phos  3.7     09-23  Mg     2.2     09-23    TPro  8.6<H>  /  Alb  4.4  /  TBili  0.3  /  DBili  x   /  AST  17  /  ALT  28  /  AlkPhos  87  09-21      Urinalysis Basic - ( 21 Sep 2021 14:30 )    Color: Yellow / Appearance: Clear / SG: >=1.030 / pH: x  Gluc: x / Ketone: NEGATIVE  / Bili: Negative / Urobili: 0.2 E.U./dL   Blood: x / Protein: Trace mg/dL / Nitrite: POSITIVE   Leuk Esterase: Moderate / RBC: 5-10 /HPF / WBC Many /HPF   Sq Epi: x / Non Sq Epi: 0-5 /HPF / Bacteria: Many /HPF        MICROBIOLOGY:            RECENT CULTURES:  09-21 @ 15:14  Clean Catch Clean Catch (Midstream)  Escherichia coli ESBL  Escherichia coli ESBL  CLAUDIA    >100,000 CFU/ml Escherichia coli ESBL  Result called to and read back by_ SABINA Rowland RN (4UR)  09/23/2021  09:21:32  --  09-21 @ 14:35  .Blood Blood-Peripheral  --  --  --    No growth at 1 day.  --      RADIOLOGY & ADDITIONAL STUDIES:

## 2021-09-23 NOTE — PROGRESS NOTE ADULT - ASSESSMENT
HPI: 53M PMHx HTN HLD recent diagnosis of prostate adenoCA via Bx, admitted to [facility] on 8/16 for sepsis 2/2 UTI, treated w Ertapenem. Scheduled for prostate resection surgery on 10/11 with preop clearance labs showing cloudy urine for which started on Bactrim but did not tolerate d/t n/v, UA resulted in WBC 29 & +UA w/ ESBL E Coli sensitive and advised to go to the ED with a positive UA admitted for Abx therapy. HPI: 53M PMHx HTN HLD, prostate adenoCA with recent sepsis 2/2 UTI in august (Ertapenem) presented for IV antibiotics for UTI found on preop clearance w/ ESBL E Coli. Treated with ertapenem with likely prostate involvement as previous ertapenem UTI recurred. Now pending CT A/P with contrast to evaluate for abscess and duration of ABX for PICC line.

## 2021-09-23 NOTE — PROGRESS NOTE ADULT - ASSESSMENT
53M PMHx HTN HLD recent diagnosis of prostate adenoCA via Bx, admitted to [facility] on 8/16 for sepsis 2/2 UTI, treated w Ertapenem. Scheduled for prostate resection surgery on 10/11 with preop clearance labs showing cloudy urine for which started on Bactrim but did not tolerate d/t n/v, UA resulted in WBC 29 & +UA w/ ESBL E Coli sensitive and advised to go to the ED with a positive UA admitted for Abx therapy. ID consulted for ESBL EColi UTI.     In this patient with recurrent ESBL EColi UTI since prostate biopsy, cannot rule out prostate abscess/prostatitis. Urine culture from this admission shows ESBL EColi that is sensitive to Ertapenem. Rectal exam negative for prostate tenderness. PSA level slightly elevated, however no prior to compare.     Recommendations:   - CTAP with IV contrast to rule out prostate abscess   - Continue Ertapenem 1g q24H for at least 7 days, however duration of therapy will depend on CT results  - PICC line indicated, can obtain one now    Discussed with primary team.    ID Team 1 will continue to follow. Please see below attending attestation for finalized recommendations.  Indira Garcia, DO - PGY2 Internal Medicine  Infectious Disease Consult Service, Team 1

## 2021-09-23 NOTE — PROGRESS NOTE ADULT - PROBLEM SELECTOR PLAN 1
Prostate adenoCA disgnosed via Bx, subsequent previous admission for sepsis 2/2 UTI, treated w Ertapenem. Had cloudy urine at pre-op clearance visit given, TMP--160mg BID x 7days however couldn't tolerated d/t vomiting. Later found to have WBC 29 & +UA w/ ESBL E Coli, and advised to go to the ED s/p Meropenem 1g.  - ED UA: Trace protein,+ Nitrites, +LE, Large blood, Many WBCs, 5-10 RBCs, Many bacteria  - Uro saw pt in ED no intervention at this time  - c/w IV Meropenem 1g q8h  -Appreciate ID recs  - f/u UCx, BCx  - bladder scan q8h, can discontinue later if not retaining Has past UTI Treated with ertapenem now with likely prostate involvement  (related to prostate ca) as previous ertapenem UTI recurred. ED UA: Trace protein,+ Nitrites, +LE, Large blood, Many WBCs, 5-10 RBCs, Many bacteria  - Uro saw pt in ED no intervention at this time  -Ertaenem 1g q24  -F/u CT Abdomen pelvis read  - f/u UCx, BCx Has past UTI Treated with ertapenem now with likely prostate involvement  (related to prostate ca) as previous ertapenem UTI recurred. ED UA: Trace protein,+ Nitrites, +LE, Large blood, Many WBCs, 5-10 RBCs, Many bacteria. Blood Cx NGTD 2 days. Urine Cx grew ESBL Ecoli.  - Uro saw pt in ED no intervention at this time  -Ertaenem 1g q24  -F/u CT Abdomen pelvis read

## 2021-09-23 NOTE — PROGRESS NOTE ADULT - SUBJECTIVE AND OBJECTIVE BOX
Incomplete  OVERNIGHT EVENTS:    SUBJECTIVE / INTERVAL HPI: Patient seen and examined at bedside.     VITAL SIGNS:  Vital Signs Last 24 Hrs  T(C): 36.5 (23 Sep 2021 05:32), Max: 36.7 (22 Sep 2021 09:14)  T(F): 97.7 (23 Sep 2021 05:32), Max: 98 (22 Sep 2021 09:14)  HR: 67 (23 Sep 2021 05:32) (67 - 88)  BP: 115/71 (23 Sep 2021 05:32) (115/71 - 124/80)  BP(mean): --  RR: 18 (23 Sep 2021 05:32) (18 - 18)  SpO2: 96% (23 Sep 2021 05:32) (96% - 98%)    PHYSICAL EXAM:    General: Well developed, well nourished, no acute distress  HEENT: NC/AT; PERRL, anicteric sclera; MMM  Neck: supple  Cardiovascular: +S1/S2, RRR, no murmurs, rubs, gallops  Respiratory: CTA B/L; no W/R/R  Gastrointestinal: soft, NT/ND; +BSx4  Extremities: WWP; no edema, clubbing or cyanosis  Vascular: 2+ radial, DP/PT pulses B/L  Neurological: AAOx3; no focal deficits    MEDICATIONS:  MEDICATIONS  (STANDING):  enoxaparin Injectable 40 milliGRAM(s) SubCutaneous every 24 hours  ertapenem  IVPB 1000 milliGRAM(s) IV Intermittent every 24 hours    MEDICATIONS  (PRN):      ALLERGIES:  Allergies    No Known Allergies    Intolerances        LABS:                        13.7   7.20  )-----------( 323      ( 22 Sep 2021 12:43 )             40.9     09-22    139  |  101  |  21  ----------------------------<  127<H>  4.3   |  27  |  1.40<H>    Ca    9.7      22 Sep 2021 06:06    TPro  8.6<H>  /  Alb  4.4  /  TBili  0.3  /  DBili  x   /  AST  17  /  ALT  28  /  AlkPhos  87  09-21      Urinalysis Basic - ( 21 Sep 2021 14:30 )    Color: Yellow / Appearance: Clear / SG: >=1.030 / pH: x  Gluc: x / Ketone: NEGATIVE  / Bili: Negative / Urobili: 0.2 E.U./dL   Blood: x / Protein: Trace mg/dL / Nitrite: POSITIVE   Leuk Esterase: Moderate / RBC: 5-10 /HPF / WBC Many /HPF   Sq Epi: x / Non Sq Epi: 0-5 /HPF / Bacteria: Many /HPF      CAPILLARY BLOOD GLUCOSE          RADIOLOGY & ADDITIONAL TESTS: Reviewed.    PLAN:  Incomplete  OVERNIGHT EVENTS: VSS    SUBJECTIVE / INTERVAL HPI: Patient seen and examined at bedside.  Denied n/v. Denied dysuria    VITAL SIGNS:  Vital Signs Last 24 Hrs  T(C): 36.5 (23 Sep 2021 05:32), Max: 36.7 (22 Sep 2021 09:14)  T(F): 97.7 (23 Sep 2021 05:32), Max: 98 (22 Sep 2021 09:14)  HR: 67 (23 Sep 2021 05:32) (67 - 88)  BP: 115/71 (23 Sep 2021 05:32) (115/71 - 124/80)  BP(mean): --  RR: 18 (23 Sep 2021 05:32) (18 - 18)  SpO2: 96% (23 Sep 2021 05:32) (96% - 98%)    PHYSICAL EXAM:    General: NAD  HEENT: NC/AT; PERRL, anicteric sclera; MMM  Neck: supple  Cardiovascular: +S1/S2, RRR, no murmurs, rubs, gallops  Respiratory: CTA B/L; no W/R/R  Gastrointestinal: soft, NT/ND; +BSx4. no suprabic tenderness. No CVA tenderness  Extremities: WWP; no edema, clubbing or cyanosis  Vascular: 2+ radial, DP/PT pulses B/L  Neurological: AAOx3; no focal deficits    MEDICATIONS:  MEDICATIONS  (STANDING):  enoxaparin Injectable 40 milliGRAM(s) SubCutaneous every 24 hours  ertapenem  IVPB 1000 milliGRAM(s) IV Intermittent every 24 hours    MEDICATIONS  (PRN):      ALLERGIES:  Allergies    No Known Allergies    Intolerances        LABS:                        13.7   7.20  )-----------( 323      ( 22 Sep 2021 12:43 )             40.9     09-22    139  |  101  |  21  ----------------------------<  127<H>  4.3   |  27  |  1.40<H>    Ca    9.7      22 Sep 2021 06:06    TPro  8.6<H>  /  Alb  4.4  /  TBili  0.3  /  DBili  x   /  AST  17  /  ALT  28  /  AlkPhos  87  09-21      Urinalysis Basic - ( 21 Sep 2021 14:30 )    Color: Yellow / Appearance: Clear / SG: >=1.030 / pH: x  Gluc: x / Ketone: NEGATIVE  / Bili: Negative / Urobili: 0.2 E.U./dL   Blood: x / Protein: Trace mg/dL / Nitrite: POSITIVE   Leuk Esterase: Moderate / RBC: 5-10 /HPF / WBC Many /HPF   Sq Epi: x / Non Sq Epi: 0-5 /HPF / Bacteria: Many /HPF      CAPILLARY BLOOD GLUCOSE          RADIOLOGY & ADDITIONAL TESTS: Reviewed.    PLAN:  OVERNIGHT EVENTS: VSS    SUBJECTIVE / INTERVAL HPI: Patient seen and examined at bedside.  Denied n/v. Denied dysuria    VITAL SIGNS:  Vital Signs Last 24 Hrs  T(C): 36.5 (23 Sep 2021 05:32), Max: 36.7 (22 Sep 2021 09:14)  T(F): 97.7 (23 Sep 2021 05:32), Max: 98 (22 Sep 2021 09:14)  HR: 67 (23 Sep 2021 05:32) (67 - 88)  BP: 115/71 (23 Sep 2021 05:32) (115/71 - 124/80)  BP(mean): --  RR: 18 (23 Sep 2021 05:32) (18 - 18)  SpO2: 96% (23 Sep 2021 05:32) (96% - 98%)    PHYSICAL EXAM:    General: NAD  HEENT: NC/AT; PERRL, anicteric sclera; MMM  Neck: supple  Cardiovascular: +S1/S2, RRR, no murmurs, rubs, gallops  Respiratory: CTA B/L; no W/R/R  Gastrointestinal: soft, NT/ND; +BSx4. no suprabic tenderness. No CVA tenderness  Extremities: WWP; no edema, clubbing or cyanosis  Vascular: 2+ radial, DP/PT pulses B/L  Neurological: AAOx3; no focal deficits    MEDICATIONS:  MEDICATIONS  (STANDING):  enoxaparin Injectable 40 milliGRAM(s) SubCutaneous every 24 hours  ertapenem  IVPB 1000 milliGRAM(s) IV Intermittent every 24 hours    MEDICATIONS  (PRN):      ALLERGIES:  Allergies    No Known Allergies    Intolerances        LABS:                        13.7   7.20  )-----------( 323      ( 22 Sep 2021 12:43 )             40.9     09-22    139  |  101  |  21  ----------------------------<  127<H>  4.3   |  27  |  1.40<H>    Ca    9.7      22 Sep 2021 06:06    TPro  8.6<H>  /  Alb  4.4  /  TBili  0.3  /  DBili  x   /  AST  17  /  ALT  28  /  AlkPhos  87  09-21      Urinalysis Basic - ( 21 Sep 2021 14:30 )    Color: Yellow / Appearance: Clear / SG: >=1.030 / pH: x  Gluc: x / Ketone: NEGATIVE  / Bili: Negative / Urobili: 0.2 E.U./dL   Blood: x / Protein: Trace mg/dL / Nitrite: POSITIVE   Leuk Esterase: Moderate / RBC: 5-10 /HPF / WBC Many /HPF   Sq Epi: x / Non Sq Epi: 0-5 /HPF / Bacteria: Many /HPF      CAPILLARY BLOOD GLUCOSE          RADIOLOGY & ADDITIONAL TESTS: Reviewed.    PLAN:

## 2021-09-24 ENCOUNTER — TRANSCRIPTION ENCOUNTER (OUTPATIENT)
Age: 56
End: 2021-09-24

## 2021-09-24 VITALS
TEMPERATURE: 98 F | DIASTOLIC BLOOD PRESSURE: 71 MMHG | HEART RATE: 69 BPM | SYSTOLIC BLOOD PRESSURE: 107 MMHG | RESPIRATION RATE: 18 BRPM | OXYGEN SATURATION: 95 %

## 2021-09-24 LAB
ANION GAP SERPL CALC-SCNC: 7 MMOL/L — SIGNIFICANT CHANGE UP (ref 5–17)
BASOPHILS # BLD AUTO: 0.02 K/UL — SIGNIFICANT CHANGE UP (ref 0–0.2)
BASOPHILS NFR BLD AUTO: 0.4 % — SIGNIFICANT CHANGE UP (ref 0–2)
BUN SERPL-MCNC: 19 MG/DL — SIGNIFICANT CHANGE UP (ref 7–23)
CALCIUM SERPL-MCNC: 9.9 MG/DL — SIGNIFICANT CHANGE UP (ref 8.4–10.5)
CHLORIDE SERPL-SCNC: 103 MMOL/L — SIGNIFICANT CHANGE UP (ref 96–108)
CO2 SERPL-SCNC: 29 MMOL/L — SIGNIFICANT CHANGE UP (ref 22–31)
CREAT SERPL-MCNC: 1.33 MG/DL — HIGH (ref 0.5–1.3)
EOSINOPHIL # BLD AUTO: 0.09 K/UL — SIGNIFICANT CHANGE UP (ref 0–0.5)
EOSINOPHIL NFR BLD AUTO: 1.7 % — SIGNIFICANT CHANGE UP (ref 0–6)
GLUCOSE SERPL-MCNC: 117 MG/DL — HIGH (ref 70–99)
HCT VFR BLD CALC: 40.9 % — SIGNIFICANT CHANGE UP (ref 39–50)
HGB BLD-MCNC: 13.5 G/DL — SIGNIFICANT CHANGE UP (ref 13–17)
IMM GRANULOCYTES NFR BLD AUTO: 0.4 % — SIGNIFICANT CHANGE UP (ref 0–1.5)
LYMPHOCYTES # BLD AUTO: 1.49 K/UL — SIGNIFICANT CHANGE UP (ref 1–3.3)
LYMPHOCYTES # BLD AUTO: 28.1 % — SIGNIFICANT CHANGE UP (ref 13–44)
MAGNESIUM SERPL-MCNC: 2.2 MG/DL — SIGNIFICANT CHANGE UP (ref 1.6–2.6)
MCHC RBC-ENTMCNC: 27.6 PG — SIGNIFICANT CHANGE UP (ref 27–34)
MCHC RBC-ENTMCNC: 33 GM/DL — SIGNIFICANT CHANGE UP (ref 32–36)
MCV RBC AUTO: 83.6 FL — SIGNIFICANT CHANGE UP (ref 80–100)
MONOCYTES # BLD AUTO: 0.54 K/UL — SIGNIFICANT CHANGE UP (ref 0–0.9)
MONOCYTES NFR BLD AUTO: 10.2 % — SIGNIFICANT CHANGE UP (ref 2–14)
NEUTROPHILS # BLD AUTO: 3.15 K/UL — SIGNIFICANT CHANGE UP (ref 1.8–7.4)
NEUTROPHILS NFR BLD AUTO: 59.2 % — SIGNIFICANT CHANGE UP (ref 43–77)
NRBC # BLD: 0 /100 WBCS — SIGNIFICANT CHANGE UP (ref 0–0)
PHOSPHATE SERPL-MCNC: 3.2 MG/DL — SIGNIFICANT CHANGE UP (ref 2.5–4.5)
PLATELET # BLD AUTO: 349 K/UL — SIGNIFICANT CHANGE UP (ref 150–400)
POTASSIUM SERPL-MCNC: 4.4 MMOL/L — SIGNIFICANT CHANGE UP (ref 3.5–5.3)
POTASSIUM SERPL-SCNC: 4.4 MMOL/L — SIGNIFICANT CHANGE UP (ref 3.5–5.3)
RBC # BLD: 4.89 M/UL — SIGNIFICANT CHANGE UP (ref 4.2–5.8)
RBC # FLD: 12.9 % — SIGNIFICANT CHANGE UP (ref 10.3–14.5)
SODIUM SERPL-SCNC: 139 MMOL/L — SIGNIFICANT CHANGE UP (ref 135–145)
WBC # BLD: 5.31 K/UL — SIGNIFICANT CHANGE UP (ref 3.8–10.5)
WBC # FLD AUTO: 5.31 K/UL — SIGNIFICANT CHANGE UP (ref 3.8–10.5)

## 2021-09-24 PROCEDURE — 83605 ASSAY OF LACTIC ACID: CPT

## 2021-09-24 PROCEDURE — G0103: CPT

## 2021-09-24 PROCEDURE — 81001 URINALYSIS AUTO W/SCOPE: CPT

## 2021-09-24 PROCEDURE — 99285 EMERGENCY DEPT VISIT HI MDM: CPT | Mod: 25

## 2021-09-24 PROCEDURE — 87635 SARS-COV-2 COVID-19 AMP PRB: CPT

## 2021-09-24 PROCEDURE — 74177 CT ABD & PELVIS W/CONTRAST: CPT

## 2021-09-24 PROCEDURE — 86803 HEPATITIS C AB TEST: CPT

## 2021-09-24 PROCEDURE — 84100 ASSAY OF PHOSPHORUS: CPT

## 2021-09-24 PROCEDURE — 36569 INSJ PICC 5 YR+ W/O IMAGING: CPT

## 2021-09-24 PROCEDURE — 80053 COMPREHEN METABOLIC PANEL: CPT

## 2021-09-24 PROCEDURE — 71045 X-RAY EXAM CHEST 1 VIEW: CPT

## 2021-09-24 PROCEDURE — 83735 ASSAY OF MAGNESIUM: CPT

## 2021-09-24 PROCEDURE — 80048 BASIC METABOLIC PNL TOTAL CA: CPT

## 2021-09-24 PROCEDURE — 99232 SBSQ HOSP IP/OBS MODERATE 35: CPT | Mod: GC

## 2021-09-24 PROCEDURE — 36415 COLL VENOUS BLD VENIPUNCTURE: CPT

## 2021-09-24 PROCEDURE — 86769 SARS-COV-2 COVID-19 ANTIBODY: CPT

## 2021-09-24 PROCEDURE — 76937 US GUIDE VASCULAR ACCESS: CPT | Mod: 26,59

## 2021-09-24 PROCEDURE — 96374 THER/PROPH/DIAG INJ IV PUSH: CPT

## 2021-09-24 PROCEDURE — 99233 SBSQ HOSP IP/OBS HIGH 50: CPT | Mod: GC

## 2021-09-24 PROCEDURE — 36573 INSJ PICC RS&I 5 YR+: CPT

## 2021-09-24 PROCEDURE — 87040 BLOOD CULTURE FOR BACTERIA: CPT

## 2021-09-24 PROCEDURE — 87086 URINE CULTURE/COLONY COUNT: CPT

## 2021-09-24 PROCEDURE — 87186 SC STD MICRODIL/AGAR DIL: CPT

## 2021-09-24 PROCEDURE — 85025 COMPLETE CBC W/AUTO DIFF WBC: CPT

## 2021-09-24 RX ORDER — SODIUM CHLORIDE 9 MG/ML
10 INJECTION INTRAMUSCULAR; INTRAVENOUS; SUBCUTANEOUS
Refills: 0 | Status: DISCONTINUED | OUTPATIENT
Start: 2021-09-24 | End: 2021-09-24

## 2021-09-24 RX ORDER — ERTAPENEM SODIUM 1 G/1
1 INJECTION, POWDER, LYOPHILIZED, FOR SOLUTION INTRAMUSCULAR; INTRAVENOUS
Qty: 42 | Refills: 0
Start: 2021-09-24 | End: 2021-11-04

## 2021-09-24 RX ADMIN — ERTAPENEM SODIUM 120 MILLIGRAM(S): 1 INJECTION, POWDER, LYOPHILIZED, FOR SOLUTION INTRAMUSCULAR; INTRAVENOUS at 17:44

## 2021-09-24 NOTE — CONSULT NOTE ADULT - SUBJECTIVE AND OBJECTIVE BOX
Patient is a 56y old  Male who presents with a chief complaint of ESBL E. coli UTI (22 Sep 2021 11:44)    HPI:  HPI: 53M PMHx HTN & HLD (non-compliant w meds), recent diagnosis of prostate adenoCA via Bx by Dr Orville Gallagher on 8/10, admitted to [facility] on  for sepsis 2/2 UTI, had a PICC line placed and was on Ertapenem 1g q24h until . Currently scheduled for prostate resection surgery on 10/11. For pre-op clearance visited his Urologist Dr. Oni Michael on  for labs, started on TMP--160mg BID x7days. Today , labs resulted significant for WBC 29 & +UA w/ ESBL E Coli, and advised to go to the ED. Symptomatically patient has dysuria, urinary urgency & frequency voiding small volumes at time. Denies fevers, flank or abdominal pain. Day prior to admission he reported new onset night sweats.    In the ED,  VS: T 98.2F 36.8C, HR 76, /74, RR 18, SpO2 98% RA  Labs: WNL  UA: Trace protein,+ Nitrites, +LE, Large blood, Many WBCs, 5-10 RBCs, Many bacteria  EKG: NSR, QTc 413  CXR: NAD  Orders: Meropenem 1g   (21 Sep 2021 18:34)    Additional ID History:  used. Patient and wife at bedside. They state that he has had multiple UTIs requiring antibiotics since his prostate biopsy on 8/10. He was admitted to the hospital for 3 weeks ([facility] in [place]) and received antibiotics (reportedly Ertapenem) through a PICC there. Prior to the prostate biopsy he has not had any UTIs. He admits to frequent urination and some pain/burning. Denies any blood in the urine.       REVIEW OF SYSTEMS  All review of systems negative, except as indicated in HPI.     Allergies    No Known Allergies    Intolerances      Antimicrobials:  ertapenem  IVPB 1000 milliGRAM(s) IV Intermittent every 24 hours      Other Medications:  enoxaparin Injectable 40 milliGRAM(s) SubCutaneous every 24 hours      FAMILY HISTORY:    PAST MEDICAL & SURGICAL HISTORY:  Cancer of prostate    No significant past surgical history      SOCIAL HISTORY:    Daily     Daily Weight in k.4 (22 Sep 2021 00:23)  Head Circumference:  Vital Signs Last 24 Hrs  T(C): 36.7 (22 Sep 2021 09:14), Max: 36.9 (22 Sep 2021 00:23)  T(F): 98 (22 Sep 2021 09:14), Max: 98.5 (22 Sep 2021 00:23)  HR: 88 (22 Sep 2021 09:14) (76 - 88)  BP: 122/80 (22 Sep 2021 09:14) (115/72 - 154/67)  BP(mean): --  RR: 18 (22 Sep 2021 09:14) (16 - 18)  SpO2: 97% (22 Sep 2021 09:14) (97% - 98%)    PHYSICAL EXAM  General: resting in bed in NAD   HEENT: no abnormalities   Heart: RRR, no m/r/g  Lungs: CTAB   Abdomen: soft, non-tender, non-distended. No CVAT, no suprapubic tenderness.   Extremities: WWP     Lab Results:                        13.7   7.20  )-----------( 323      ( 22 Sep 2021 12:43 )             40.9     09-22    139  |  101  |  21  ----------------------------<  127<H>  4.3   |  27  |  1.40<H>    Ca    9.7      22 Sep 2021 06:06    TPro  8.6<H>  /  Alb  4.4  /  TBili  0.3  /  DBili  x   /  AST  17  /  ALT  28  /  AlkPhos  87  09-21    LIVER FUNCTIONS - ( 21 Sep 2021 13:48 )  Alb: 4.4 g/dL / Pro: 8.6 g/dL / ALK PHOS: 87 U/L / ALT: 28 U/L / AST: 17 U/L / GGT: x             Urinalysis Basic - ( 21 Sep 2021 14:30 )    Color: Yellow / Appearance: Clear / SG: >=1.030 / pH: x  Gluc: x / Ketone: NEGATIVE  / Bili: Negative / Urobili: 0.2 E.U./dL   Blood: x / Protein: Trace mg/dL / Nitrite: POSITIVE   Leuk Esterase: Moderate / RBC: 5-10 /HPF / WBC Many /HPF   Sq Epi: x / Non Sq Epi: 0-5 /HPF / Bacteria: Many /HPF        MICROBIOLOGY  Culture Results:   >100,000 CFU/ml Escherichia coli  Susceptibility to follow. (21 @ 15:14)    Specimen Source: .Blood Blood-Peripheral (21 @ 14:35)  
Vascular Access Service Consult Note    56yMaleHEALTH ISSUES - PROBLEM Dx:  Bacterial UTI    Adenocarcinoma of prostate    History of hypertension    History of hyperlipidemia    Healthcare maintenance               Diagnosis: prostatitis     Indications for Vascular Access (Check all that apply)  [ X ]  Antibiotic Therapy       Antibiotic Prescribed:  ertapenem                                                        Expected Duration of Therapy:  6 weeks             [  ]  IV Hydration  [  ]  Total Parenteral Nutrition  [  ]  Chemotherapy  [  ]  Difficult Venous Access  [  ]  CVP monitoring  [  ]  Medications with high potential for tissue necrosis on extravasation  [  ]  Other    Screening (Check all that apply)  Previous Radiation to chest  [  ] Yes      [ X ]  No  Breast Cancer                          [  ] Left     [  ]  Right    [ X ]  No  Lymph Node Dissection         [  ] Left     [  ]  Right    [  ]X  No  Pacemaker or ICD                   [  ] Left     [  ]  Right    [X  ]  No  Upper Extremity DVT             [  ] Left     [  ]  Right    [ X ]  No  Chronic Kidney Disease         [  ]  Yes     [ X ]  No  Hemodialysis                           [  ]  Yes     [ X ]  No  AV Fistula/ Graft                     [  ]  Left    [  ]  Right    [X  ]  No  Temp>101F in past 24 H       [  ]  Yes     [X  ]  No  H/O PICC/Midline                   [  ]  Yes     [ X ]  No    Lab data:                        13.4   6.63  )-----------( 335      ( 23 Sep 2021 07:14 )             40.9     09-23    138  |  100  |  17  ----------------------------<  115<H>  4.6   |  29  |  1.39<H>    Ca    9.9      23 Sep 2021 07:14  Phos  3.7     09-23  Mg     2.2     09-23                I have reviewed the chart, interviewed and examined the patient and determined that this patient:  [ X ] Is a candidate for a PICC line  [  ] Is a candidate for a Midline  [  ] Is not a candidate for vascular access device (reason)    Lumens:    [  X] Single  [  ] Double
HPI: 53M PMHx HTN & HLD (non-compliant w meds), recent diagnosis of prostate adenoCA via Bx by Dr Orville Gallagher on 8/10, admitted to [facility] on 8/16 for sepsis 2/2 UTI, had a PICC line placed and was on Ertapenem 1g q24h until 9/8. Currently scheduled for prostate resection surgery on 10/11. For pre-op clearance visited his Urologist Dr. Oni Michael on 9/16 for labs, started on TMP--160mg BID x7days. Today 9/21, labs resulted significant for WBC 29 & +UA w/ ESBL E Coli, and advised to go to the ED. Symptomatically patient has dysuria, urinary urgency & frequency voiding small volumes at time. Denies fevers, flank or abdominal pain. Yesterday he report new onset night sweats.     consulted for CT findings of:  1. There are small low-density foci in the apex of the prostate, right larger than left. (There is a 0.4 x 1.0 x 0.8 cm low-density focus in the right apex of the prostate. There is a 0.3 x 0.3 x 0.3 cm low-density focus in the left apex of the prostate) These may represent small early abscesses.    2. There is a 2.9 cm low-density lesion in the left kidney which is more dense than a simple cyst. It could be a complex cystic lesion or a solid mass. A subcentimeter low-density lesion in the right kidney is too small to characterize. The possibility of renal abscesses cannot be excluded. Either a CT scan or MRI with renal mass protocol is recommended for further evaluation.    Pt denies abdominal discomfort, dysuria, frequency, urgency. Bladder scan 0cc.   Per ID  he will need  Recommendations:   - Continue Ertapenem 1g q24H for 6 weeks since day of last negative blood culture (9/21 = day1)  - Obtain repeat CTAP with IV contrast at end of 6 week therapy to ensure resolution of abscess   - Recommend urology consult for source control (IR drainage of abscess vs prostatectomy)         Vital Signs Last 24 Hrs  T(C): 36.4 (24 Sep 2021 09:12), Max: 36.7 (23 Sep 2021 16:30)  T(F): 97.6 (24 Sep 2021 09:12), Max: 98 (23 Sep 2021 16:30)  HR: 69 (24 Sep 2021 09:12) (68 - 74)  BP: 107/71 (24 Sep 2021 09:12) (107/71 - 133/77)  BP(mean): --  RR: 18 (24 Sep 2021 09:12) (17 - 18)  SpO2: 95% (24 Sep 2021 09:12) (95% - 98%)  I&O's Summary      PE:  Gen: awake and alert  Abd: nontender, nondistended  : no suprapubic/CVAT      LABS:                        13.5   5.31  )-----------( 349      ( 24 Sep 2021 09:03 )             40.9     09-24    139  |  103  |  19  ----------------------------<  117<H>  4.4   |  29  |  1.33<H>    Ca    9.9      24 Sep 2021 09:03  Phos  3.2     09-24  Mg     2.2     09-24        Cultures  Culture Results:   >100,000 CFU/ml Escherichia coli ESBL  Result called to and read back by_ SABINA Rowland RN (4UR)  09/23/2021  09:21:32 (09-21 @ 15:14)  Culture Results:   No growth at 3 days. (09-21 @ 14:35)  Culture Results:   No growth at 3 days. (09-21 @ 14:35)      A/P

## 2021-09-24 NOTE — DISCHARGE NOTE PROVIDER - NSDCCPCAREPLAN_GEN_ALL_CORE_FT
PRINCIPAL DISCHARGE DIAGNOSIS  Diagnosis: Bacterial UTI  Assessment and Plan of Treatment: You have an infection of your urinary tract. This is likely related to the prostate gland. You did not have a fever or high white blood cells. Our infectious disease team saw you and recommended 6 weeks of antibiotics which you will get in the PICC in your arm until November 3. Please attend your appointment with Dr. Michael on 9/28 at 10am.       PRINCIPAL DISCHARGE DIAGNOSIS  Diagnosis: Bacterial UTI  Assessment and Plan of Treatment: Urinary tract infections, also called "UTIs," are infections that affect either the bladder or the kidneys. Bladder infections are more common than kidney infections. Bladder infections happen when bacteria get into the urethra and travel up into the bladder. Kidney infections happen when the bacteria travel even higher, up into the kidneys. The symptoms of a bladder infection include pain or a burning feeling when you urinate, the need to urinate often, the need to urinate suddenly or in a hurry, blood in the urine. Signs that an infection has spread to the kidneys include fever, back pain, or nausea/vomiting. It is important that you take your antibiotics as prescribed and to completion to properly treat your urinary tract infection and prevent antibiotic resistance.  You were found to have a urinary infection positive for ESBL E. Coli. This is a resistant bacteria, which is why you had to be placed on IV antibiotics ertapenem. We also performed at CT of your abdomen and pelvis which showed possible abscesses on your prostate and kidney. These could be contributing to your reccurent UTI's. Becuase of this, you will have to be on IV antibiotics for 6 weeks through the PICC line inserted in your arm.  INSTRUCTIONS:  -You will be receiving home infusing of the antibiotic Ertapenem 1 g daily for 6 weeks total (until November 3rd).   -Send weekly CBC and CMP labs to Dr. Martini (infectious disease) at [phone].  -Please attend your appointment with Dr. Michael on 9/28 at 10am.  Please call your doctor or come back to the office if you develop fevers, chills, pain on urination, back pain or abdominal pain.      SECONDARY DISCHARGE DIAGNOSES  Diagnosis: Adenocarcinoma of prostate  Assessment and Plan of Treatment: Urology saw you in the hospital. Because of your prostate abscess found on CT scan, your prostate surgery will be delayed until you complete your 6 weeks of IV antibiotics. You will need repeat imaging once the 6 weeks of antibiotics are complete.   Please attend your appointment with Dr. Michael on 9/28 at 10am.

## 2021-09-24 NOTE — DISCHARGE NOTE PROVIDER - PROVIDER TOKENS
PROVIDER:[[id number]],SCHEDULEDAPPT:[09/28/2021],SCHEDULEDAPPTTIME:[10:00 AM]] PROVIDER:[[id number]],SCHEDULEDAPPT:[09/28/2021],SCHEDULEDAPPTTIME:[10:00 AM]],PROVIDER:[[id number]],SCHEDULEDAPPT:[10/07/2021],SCHEDULEDAPPTTIME:[11:00 AM]]

## 2021-09-24 NOTE — PROGRESS NOTE ADULT - PROBLEM SELECTOR PLAN 4
Reports going to the gym, hence not taking any medication. Has Rx for rosuvostatin 40. Amenable to taking statin.   - f/u PCP for w/u

## 2021-09-24 NOTE — DISCHARGE NOTE NURSING/CASE MANAGEMENT/SOCIAL WORK - PATIENT PORTAL LINK FT
You can access the FollowMyHealth Patient Portal offered by [facility] by registering at the following website: [email or web address]. By joining [employer]’s FollowMyHealth portal, you will also be able to view your health information using other applications (apps) compatible with our system.

## 2021-09-24 NOTE — PROGRESS NOTE ADULT - SUBJECTIVE AND OBJECTIVE BOX
OVERNIGHT EVENTS: VSS    SUBJECTIVE / INTERVAL HPI: Patient seen and examined at bedside.     VITAL SIGNS:  Vital Signs Last 24 Hrs  T(C): 36.1 (24 Sep 2021 05:52), Max: 36.7 (23 Sep 2021 16:30)  T(F): 97 (24 Sep 2021 05:52), Max: 98 (23 Sep 2021 16:30)  HR: 74 (24 Sep 2021 05:52) (68 - 78)  BP: 133/77 (24 Sep 2021 05:52) (113/74 - 133/77)  BP(mean): --  RR: 18 (24 Sep 2021 05:52) (16 - 18)  SpO2: 97% (24 Sep 2021 05:52) (97% - 98%)    PHYSICAL EXAM:    General: Well developed, well nourished, no acute distress  HEENT: NC/AT; PERRL, anicteric sclera; MMM  Neck: supple  Cardiovascular: +S1/S2, RRR, no murmurs, rubs, gallops  Respiratory: CTA B/L; no W/R/R  Gastrointestinal: soft, NT/ND; +BSx4  Extremities: WWP; no edema, clubbing or cyanosis  Vascular: 2+ radial, DP/PT pulses B/L  Neurological: AAOx3; no focal deficits    MEDICATIONS:  MEDICATIONS  (STANDING):  enoxaparin Injectable 40 milliGRAM(s) SubCutaneous every 24 hours  ertapenem  IVPB 1000 milliGRAM(s) IV Intermittent every 24 hours    MEDICATIONS  (PRN):      ALLERGIES:  Allergies    No Known Allergies    Intolerances        LABS:                        13.4   6.63  )-----------( 335      ( 23 Sep 2021 07:14 )             40.9     09-23    138  |  100  |  17  ----------------------------<  115<H>  4.6   |  29  |  1.39<H>    Ca    9.9      23 Sep 2021 07:14  Phos  3.7     09-23  Mg     2.2     09-23          CAPILLARY BLOOD GLUCOSE          RADIOLOGY & ADDITIONAL TESTS: Reviewed.    PLAN:  OVERNIGHT EVENTS: VSS    SUBJECTIVE / INTERVAL HPI: Patient seen and examined at bedside.     VITAL SIGNS:  Vital Signs Last 24 Hrs  T(C): 36.1 (24 Sep 2021 05:52), Max: 36.7 (23 Sep 2021 16:30)  T(F): 97 (24 Sep 2021 05:52), Max: 98 (23 Sep 2021 16:30)  HR: 74 (24 Sep 2021 05:52) (68 - 78)  BP: 133/77 (24 Sep 2021 05:52) (113/74 - 133/77)  BP(mean): --  RR: 18 (24 Sep 2021 05:52) (16 - 18)  SpO2: 97% (24 Sep 2021 05:52) (97% - 98%)    PHYSICAL EXAM:    General: Well developed, well nourished, no acute distress  HEENT: NC/AT; PERRL, anicteric sclera; MMM  Neck: supple  Cardiovascular: +S1/S2, RRR, no murmurs, rubs, gallops  Respiratory: CTA B/L; no W/R/R  Gastrointestinal: soft, NT/ND; +BSx4. No suprapubic tenderness or CVA tenderness.  Extremities: WWP; no edema, clubbing or cyanosis  Vascular: 2+ radial, DP/PT pulses B/L  Neurological: AAOx3; no focal deficits    MEDICATIONS:  MEDICATIONS  (STANDING):  enoxaparin Injectable 40 milliGRAM(s) SubCutaneous every 24 hours  ertapenem  IVPB 1000 milliGRAM(s) IV Intermittent every 24 hours    MEDICATIONS  (PRN):      ALLERGIES:  Allergies    No Known Allergies    Intolerances        LABS:                        13.4   6.63  )-----------( 335      ( 23 Sep 2021 07:14 )             40.9     09-23    138  |  100  |  17  ----------------------------<  115<H>  4.6   |  29  |  1.39<H>    Ca    9.9      23 Sep 2021 07:14  Phos  3.7     09-23  Mg     2.2     09-23          CAPILLARY BLOOD GLUCOSE          RADIOLOGY & ADDITIONAL TESTS: Reviewed.    PLAN:

## 2021-09-24 NOTE — DISCHARGE NOTE PROVIDER - NSDCFUSCHEDAPPT_GEN_ALL_CORE_FT
SYBIL TIJERINA ; 09/28/2021 ; NPP Urology [address] [place] [address]  SYBIL TIJERINA ; 10/07/2021 ; NPP Med  [facility]  SYBIL TIJERINA ; 10/08/2021 ; NPP DisEmerg [address] [facility]  SYBIL TIJERINA ; 11/15/2021 ; NPP Urology [address]

## 2021-09-24 NOTE — PROGRESS NOTE ADULT - ASSESSMENT
53M PMHx HTN HLD, prostate adenoCA with recent sepsis 2/2 UTI in august (Ertapenem) presented for IV antibiotics for UTI found on preop clearance w/ ESBL E Coli found to have on CT A/P L kidney lesion and small potential prostate abscess now r/o renal mass and pending duration of Abx. 53M PMHx HTN HLD, prostate adenoCA with recent sepsis 2/2 UTI in august (Ertapenem) presented for IV antibiotics for UTI found on preop clearance w/ ESBL E Coli found to have on CT A/P L kidney lesion and small potential prostate abscess stable for d/c with PICC line for 6 total weeks of ertapenem.

## 2021-09-24 NOTE — PROGRESS NOTE ADULT - ASSESSMENT
53M PMHx HTN HLD recent diagnosis of prostate adenoCA via Bx, admitted to [facility] on 8/16 for sepsis 2/2 UTI, treated w Ertapenem. Scheduled for prostate resection surgery on 10/11 with preop clearance labs showing cloudy urine for which started on Bactrim but did not tolerate d/t n/v, UA resulted in WBC 29 & +UA w/ ESBL E Coli sensitive and advised to go to the ED with a positive UA admitted for Abx therapy. ID consulted for ESBL EColi UTI, now with CT scan showing multiple small prostatic abscesses.     Recommendations:   - Continue Ertapenem 1g q24H for 6 weeks since day of last negative blood culture (9/21 = day1)  - Obtain repeat CTAP with IV contrast at end of 6 week therapy to ensure resolution of abscess   - Recommend urology consult for source control (IR drainage of abscess vs prostatectomy)     Discussed with primary team.    Dr. Paz will be covering Team 1 tonight after 5 PM and this weekend. Please see below attending attestation for finalized recommendations.  Indira Garcia, DO - PGY2 Internal Medicine  Infectious Disease Consult Service, Team 1

## 2021-09-24 NOTE — CONSULT NOTE ADULT - ASSESSMENT
53M PMHx HTN & HLD (non-compliant w meds), recent diagnosis of prostate adenoCA via Bx by Dr Orville Gallagher on 8/10, admitted to [facility] on 8/16 for sepsis 2/2 UTI, had a PICC line placed and was on Ertapenem 1g q24h until 9/8. Currently scheduled for prostate resection surgery on 10/11. For pre-op clearance visited his Urologist Dr. Oni Michael on 9/16 for labs, started on TMP--160mg BID x7days. Today 9/21, labs resulted significant for WBC 29 & +UA w/ ESBL E Coli, and advised to go to the ED. Symptomatically patient has dysuria, urinary urgency & frequency voiding small volumes at time. Denies fevers, flank or abdominal pain. Yesterday he report new onset night sweats.     consulted for CT findings of Possible small prostate abscesses, 2.9 cm renal mass Per ID  he will need IV ertapenem for 6 weeks since  day of last negative blood culture (9/21 = day1) and repeat CTAP with IV contrast at end of 6 week therapy to ensure resolution of abscess.     Plan:  -Agree with conservative treatment of prostate abscess with IV antibx per ID given size <1cm and no rim enhancement  -prostate sx will be delayed until completion of IV anitbx course   -Repeat imaging of renal mass after completion of IV antibx course  -f/u Dr. Michael for further evaluation/tx   -recommendations discussed with  resident       
53M PMHx HTN HLD recent diagnosis of prostate adenoCA via Bx, admitted to [facility] on 8/16 for sepsis 2/2 UTI, treated w Ertapenem. Scheduled for prostate resection surgery on 10/11 with preop clearance labs showing cloudy urine for which started on Bactrim but did not tolerate d/t n/v, UA resulted in WBC 29 & +UA w/ ESBL E Coli sensitive and advised to go to the ED with a positive UA admitted for Abx therapy. ID consulted for ESBL EColi UTI.     In this patient with recurrent ESBL EColi UTI since prostate biopsy, cannot rule out prostate abscess/prostatitis.     Recommendations:   - CTAP with IV contrast to rule out prostate abscess   - Continue Ertapenem 1g q24H for at least 7 days, however duration of therapy will depend on CT results  - Perform rectal exam (non-urgent) to assess for prostate tenderness   - PICC line indicated, can insert once blood cultures are negative x 48 hours   - Obtain PSA level     Discussed with primary team.    ID Team 1 will continue to follow. Please see below attending attestation for finalized recommendations.  Indira Garcia, DO - PGY2 Internal Medicine  Infectious Disease Consult Service, Team 1

## 2021-09-24 NOTE — DISCHARGE NOTE PROVIDER - NSDCMRMEDTOKEN_GEN_ALL_CORE_FT
Ertapenem : 1 gram(s) intravenous once a day for 6 weeks total (until November 3rd, 2021)   Heparin 10 units/mL 3 mL post-infusion : 10 unit(s) intravenous once a day   Normal Saline 0.9% 10 mL Pre/Post Infusion : 1 application intravenous once a day    Ertapenem : 1 gram(s) intravenous once a day for 6 weeks total (until November 3rd, 2021)   Normal Saline 0.9% 10 mL Pre/Post Infusion : 1 application intravenous once a day

## 2021-09-24 NOTE — PROGRESS NOTE ADULT - PROBLEM SELECTOR PLAN 3
Reports normal BP readings at home. Not taking any medication  - Currently normotensive

## 2021-09-24 NOTE — DISCHARGE NOTE PROVIDER - NSDCCPTREATMENT_GEN_ALL_CORE_FT
PRINCIPAL PROCEDURE  Procedure: CT abdomen pelvis wo/w con  Findings and Treatment: 1. There are small low-density foci in the apex of the prostate, right larger than left. These may represent small early abscesses.  2. There is a 2.9 cm low-density lesion in the left kidney which is more dense than a simple cyst. It could be a complex cystic lesion or a solid mass. A subcentimeter low-density lesion in the right kidney is too small to characterize. The possibility of renal abscesses cannot be excluded. Either a CT scan or MRI with renal mass protocol is recommended for further evaluation.

## 2021-09-24 NOTE — PROGRESS NOTE ADULT - ATTENDING COMMENTS
Agree with above.  Continue Ertapenem 1 gram IV daily.  He will need 6 weeks of IV antibiotics with repeat CT scan prior to stopping antibiotics to assess for resolution.  Urology eval for drainage of abscess.    If discharged with PICC, he needs weekly CBC, CMP and can follow up with me
Agree with above. Continue ertapenem 1 gram IV daily.  Ok to place single lumen PICC.  He will need 6 weeks of IV antibiotics given high suspicion for prostatitis.  CT pelvis will help r/o abscess
ID consult and consider iv antibiotics at home  Discharge planning
Pt. seen and examined by me earlier today; I have read Dr. Chong's note, I agree w/ her findings and plan of care as documented; cont. ertapenem, per ID recs; f/u cultures, CT A/P w/ IV contrast; Pt. will need PICC line for home infusion of IV abx, duration TBD, pending CT results; Pt. has outpatient Urology f/u appt. 10/11 at 10am w/ Dr. Michael
Pt. seen and examined by me earlier today; I have read Dr. Chong's note, I agree w/ her findings and plan of care as documented; Pt. is medically-clear for d/c home w/ LUE PICC to complete 6-week course of IV ertapenem, per ID recs; CT findings reviewed; per Urology, no need for drainage (d/t small size of abscesses and lack of rim enhancement); per Urology, planned prostate surgery will be delayed until completion of IV abx; additional renal imaging will be pursued at that time as well

## 2021-09-24 NOTE — PROGRESS NOTE ADULT - SUBJECTIVE AND OBJECTIVE BOX
INFECTIOUS DISEASES FOLLOW UP    This is a follow up note for this  56yMale with ESBL E Coli UTI    update/ S: CTAP showing multiple small prostatic abscesses and possible renal abscess. Patient feels well today with no active complaints and is understanding of the plan. Is s/p PICC today as well.  used.    ROS:  negative except as above    Allergies    No Known Allergies    Intolerances        ANTIBIOTICS/RELEVANT:  antimicrobials  ertapenem  IVPB 1000 milliGRAM(s) IV Intermittent every 24 hours    immunologic:    OTHER:  enoxaparin Injectable 40 milliGRAM(s) SubCutaneous every 24 hours  sodium chloride 0.9% lock flush 10 milliLiter(s) IV Push every 1 hour PRN      Objective:  Vital Signs Last 24 Hrs  T(C): 36.4 (24 Sep 2021 09:12), Max: 36.7 (23 Sep 2021 16:30)  T(F): 97.6 (24 Sep 2021 09:12), Max: 98 (23 Sep 2021 16:30)  HR: 69 (24 Sep 2021 09:12) (68 - 74)  BP: 107/71 (24 Sep 2021 09:12) (107/71 - 133/77)  BP(mean): --  RR: 18 (24 Sep 2021 09:12) (17 - 18)  SpO2: 95% (24 Sep 2021 09:12) (95% - 98%)    PHYSICAL EXAM:  General: resting in bed in NAD   HEENT: no abnormalities   Heart: RRR, no m/r/g  Lungs: CTAB   Abdomen: soft, non-tender, non-distended. No CVAT, no suprapubic tenderness.   Extremities: WWP   Skin: no rash    LABS:                        13.5   5.31  )-----------( 349      ( 24 Sep 2021 09:03 )             40.9     09-24    139  |  103  |  19  ----------------------------<  117<H>  4.4   |  29  |  1.33<H>    Ca    9.9      24 Sep 2021 09:03  Phos  3.2     09-24  Mg     2.2     09-24            MICROBIOLOGY:            RECENT CULTURES:  09-21 @ 15:14  Clean Catch Clean Catch (Midstream)  Escherichia coli ESBL  Escherichia coli ESBL  CLAUDIA    >100,000 CFU/ml Escherichia coli ESBL  Result called to and read back by_ SABINA Rowland RN (4UR)  09/23/2021  09:21:32  --  09-21 @ 14:35  .Blood Blood-Peripheral  --  --  --    No growth at 2 days.  --      RADIOLOGY & ADDITIONAL STUDIES:

## 2021-09-24 NOTE — PROGRESS NOTE ADULT - PROBLEM SELECTOR PLAN 1
Has past UTI Treated with ertapenem now with likely prostate involvement  (related to prostate ca) as previous ertapenem UTI recurred. ED UA: Trace protein,+ Nitrites, +LE, Large blood, Many WBCs, 5-10 RBCs, Many bacteria. Blood Cx NGTD 2 days. Urine Cx grew ESBL Ecoli. CT A/P showed lesion at upper pole of L kidney and small potential abscess of prostate  -Ertaenem 1g q24  -F/u Renal mass protocol CT Abdomen pelvis with/without contrast read Has past UTI Treated with ertapenem now with likely prostate involvement  (related to prostate ca) as previous ertapenem UTI recurred. ED UA: Trace protein,+ Nitrites, +LE, Large blood, Many WBCs, 5-10 RBCs, Many bacteria. Blood Cx NGTD 2 days. Urine Cx grew ESBL Ecoli. CT A/P showed lesion at upper pole of L kidney and small potential abscess of prostate  -Ertaenem 1g q24  -F/u Renal mass protocol outpatient  -F/U appt with Dr. Michael on 9/28 10AM where imaging can be done CT Abdomen pelvis with/without contrast read

## 2021-09-24 NOTE — DISCHARGE NOTE PROVIDER - CARE PROVIDER_API CALL
Oni Michael)  Urology  [address]  [place], [place] [id number]  Phone: [phone]  Fax: [phone]  Scheduled Appointment: 09/28/2021 10:00 AM   Oni Michael)  Urology  [address], [address]  [place], [place] [address]  Phone: [phone]  Fax: [phone]  Scheduled Appointment: 09/28/2021 10:00 AM    Arianne Paz)  Infectious Disease; Internal Medicine  [address] [place] [address]  Phone: [phone]  Fax: [phone]  Scheduled Appointment: 10/07/2021 11:00 AM

## 2021-09-24 NOTE — DISCHARGE NOTE PROVIDER - CARE PROVIDERS DIRECT ADDRESSES
[email or web address]

## 2021-09-24 NOTE — DISCHARGE NOTE PROVIDER - HOSPITAL COURSE
#Discharge: do not delete    Patient is __ yo M/F with past medical history of _____, presented with _____, found to have _____    Inpatient treatment course:     Problem List/Main Diagnoses:     New medications/therapies:   New lines/hardware:  Labs to be followed outpatient:   Exam to be followed outpatient: Renal protocol     Discharge plan: discharge to home with outpatient PT     #Discharge: do not delete    53M PMHx HTN HLD, prostate adenoCA with recent sepsis 2/2 UTI in august (Ertapenem) presented for IV antibiotics for UTI found on preop clearance w/ ESBL E Coli found to have on CT A/P L kidney lesion and small potential prostate abscess stable for d/c with PICC line for 6 total weeks of ertapenem and urology follow up on 28th.    #Bacterial UTI  Has past UTI Treated with ertapenem now with likely prostate involvement  (related to prostate ca) as previous ertapenem UTI recurred. ED UA: Trace protein,+ Nitrites, +LE, Large blood, Many WBCs, 5-10 RBCs, Many bacteria. Blood Cx NGTD 2 days. Urine Cx grew ESBL Ecoli. CT A/P showed lesion at upper pole of L kidney and small potential abscess of prostate  -Ertapenem 1g q24 for 6 weeks (until 10/26)  -F/u Renal mass protocol outpatient  -F/U appt with Dr. Michael on 9/28 10AM where imaging can be done CT Abdomen pelvis with/without contrast read.    #Adenocarcinoma of prostate   Recent diagnosis of prostate adenoCA on 8/10. Scheduled for prostate resection surgery on 10/11 which will be rescheduled.  - f/u OP w Dr. Oni Michael.    #Hyperlipidemia    Has been Rx rosuvostatin 40 but not compliant as reports going to the gym instead. hence not taking any medication. Has Rx for rosuvostatin 40. Now amenable to taking statin.   - f/u PCP for w/u    #History of hypertension   Reports normal BP readings at home. Not taking any medication  - Currently normotensive.    New medications/therapies: Ertapenem  New lines/hardware: PICC  Labs to be followed outpatient:   Exam to be followed outpatient: Renal protocol     Discharge plan: discharge to home with outpatient PT     #Discharge: do not delete    53M PMHx HTN HLD, prostate adenoCA with recent sepsis 2/2 UTI in august (Ertapenem) presented for IV antibiotics for UTI found on preop clearance w/ ESBL E Coli found to have on CT A/P L kidney lesion and small potential prostate abscess stable for d/c with PICC line for 6 total weeks of ertapenem and urology follow up on 28th.    #Bacterial UTI  Has past UTI Treated with ertapenem now with likely prostate involvement  (related to prostate ca) as previous ertapenem UTI recurred. ED UA: Trace protein,+ Nitrites, +LE, Large blood, Many WBCs, 5-10 RBCs, Many bacteria. Blood Cx NGTD 2 days. Urine Cx grew ESBL Ecoli. CT A/P showed lesion at upper pole of L kidney and small potential abscess of prostate  -Ertapenem 1g q24 for 6 weeks total (until 11/3/21)   -F/u Renal mass protocol outpatient  -F/U appt with Dr. Michael on 9/28 10AM where imaging can be done CT Abdomen pelvis with/without contrast read.    #Adenocarcinoma of prostate   Recent diagnosis of prostate adenoCA on 8/10. Scheduled for prostate resection surgery on 10/11 which will be rescheduled.  - f/u OP w Dr. Oni Michael.    #Hyperlipidemia    Has been Rx rosuvostatin 40 but not compliant as reports going to the gym instead. hence not taking any medication. Has Rx for rosuvostatin 40. Now amenable to taking statin.   - f/u PCP for w/u    #History of hypertension   Reports normal BP readings at home. Not taking any medication  - Currently normotensive.    New medications/therapies: Ertapenem 1 g qd for 6 weeks total until 11/3/21   New lines/hardware: PICC line   Labs to be followed outpatient: weekly CBC and CMP - fax to Dr. Martini [phone]  Exam to be followed outpatient: Renal protocol   Follow up: urology, ID with Dr. Martini     Discharge plan: discharge to home with outpatient PT

## 2021-09-26 LAB
CULTURE RESULTS: SIGNIFICANT CHANGE UP
CULTURE RESULTS: SIGNIFICANT CHANGE UP
SPECIMEN SOURCE: SIGNIFICANT CHANGE UP
SPECIMEN SOURCE: SIGNIFICANT CHANGE UP

## 2021-09-28 ENCOUNTER — APPOINTMENT (OUTPATIENT)
Dept: UROLOGY | Facility: CLINIC | Age: 56
End: 2021-09-28
Payer: MEDICAID

## 2021-09-28 VITALS
TEMPERATURE: 98.8 F | HEART RATE: 111 BPM | SYSTOLIC BLOOD PRESSURE: 159 MMHG | DIASTOLIC BLOOD PRESSURE: 70 MMHG | OXYGEN SATURATION: 98 %

## 2021-09-28 PROCEDURE — 99213 OFFICE O/P EST LOW 20 MIN: CPT

## 2021-09-28 NOTE — HISTORY OF PRESENT ILLNESS
[FreeTextEntry1] : CC: prostate cancer, f/u after hospitalization\par \par Czech telephonic  used [id number]\par \par HPI: \par Patient is a 56 year old man, with elevated PSA 4.5 7/2021 s/p biopsy with GS 6 and 7 referred by Dr. Orville Gallagher. \par \par Patient had urosepsis after biopsy. \par GS 3+4=7 2/12 (left lateral base and right medial apex) and GS 6 1/12 core (left apex, lateral) \par MRI with 44 c prostate, no definite lesions, MRI at R \par \par Patient had ESBL e. coli. Hospitalized last week, CT AP showed:\par \par There is a 0.4 x 1.0 x 0.8 cm low-density focus in the right apex of the prostate. There is a 0.3 x 0.3 x 0.3 cm low-density focus in the left apex of the prostate. Mild surrounding hypervascularity but no well-formed rim enhancement associated with these low-density foci. No periprosthetic inflammatory change. No bladder wall thickening.\par \par There is a 2.9 cm low-density lesion in the left kidney which is more dense than a simple cyst. It could be a complex cystic lesion or a solid mass. A subcentimeter low-density lesion in the right kidney is too small to characterize. The possibility of renal abscesses cannot be excluded. Either a CT scan or MRI with renal mass protocol is recommended for further evaluation.\par \par Patient was evaluated by ID, plan for 6 weeks IV antibiotics followed by CT to document resolution.\par \par FAMHX: negative breast, ovarian, prostate \par SURGHX: none \par MEDHX: HTN \par SOCIAL: , no children, nonsmoker \par ROS: Negative 10 system other than feeling a bit weak/tired

## 2021-09-28 NOTE — ASSESSMENT
[FreeTextEntry1] : 56 year old with prostate cancer and ESBL e. coli. Currently discharged on 6 weeks IV antibiotics. Incidental renal mass seen on imaging.\par \par Plan for 6 weeks IV antibiotics\par Follow-up with ID scheduled\par CT A/P after completion of antibiotics --> should be renal protocol to evaluate renal mass seen\par RALP has been rescheduled for 11/15/21\par \par \par \par CC: Dr. Orville Gallagher

## 2021-09-29 DIAGNOSIS — E78.5 HYPERLIPIDEMIA, UNSPECIFIED: ICD-10-CM

## 2021-09-29 DIAGNOSIS — R61 GENERALIZED HYPERHIDROSIS: ICD-10-CM

## 2021-09-29 DIAGNOSIS — I10 ESSENTIAL (PRIMARY) HYPERTENSION: ICD-10-CM

## 2021-09-29 DIAGNOSIS — N28.89 OTHER SPECIFIED DISORDERS OF KIDNEY AND URETER: ICD-10-CM

## 2021-09-29 DIAGNOSIS — Z91.19 PATIENT'S NONCOMPLIANCE WITH OTHER MEDICAL TREATMENT AND REGIMEN: ICD-10-CM

## 2021-09-29 DIAGNOSIS — C61 MALIGNANT NEOPLASM OF PROSTATE: ICD-10-CM

## 2021-09-29 DIAGNOSIS — R10.9 UNSPECIFIED ABDOMINAL PAIN: ICD-10-CM

## 2021-09-29 DIAGNOSIS — N39.0 URINARY TRACT INFECTION, SITE NOT SPECIFIED: ICD-10-CM

## 2021-09-29 DIAGNOSIS — B96.20 UNSPECIFIED ESCHERICHIA COLI [E. COLI] AS THE CAUSE OF DISEASES CLASSIFIED ELSEWHERE: ICD-10-CM

## 2021-09-29 DIAGNOSIS — Z16.12 EXTENDED SPECTRUM BETA LACTAMASE (ESBL) RESISTANCE: ICD-10-CM

## 2021-10-07 ENCOUNTER — APPOINTMENT (OUTPATIENT)
Dept: INFECTIOUS DISEASE | Facility: CLINIC | Age: 56
End: 2021-10-07

## 2021-10-08 ENCOUNTER — APPOINTMENT (OUTPATIENT)
Dept: DISASTER EMERGENCY | Facility: CLINIC | Age: 56
End: 2021-10-08

## 2021-10-13 ENCOUNTER — APPOINTMENT (OUTPATIENT)
Dept: INFECTIOUS DISEASE | Facility: CLINIC | Age: 56
End: 2021-10-13
Payer: MEDICAID

## 2021-10-13 VITALS
SYSTOLIC BLOOD PRESSURE: 167 MMHG | HEIGHT: 68 IN | DIASTOLIC BLOOD PRESSURE: 100 MMHG | WEIGHT: 171.13 LBS | TEMPERATURE: 98.3 F | BODY MASS INDEX: 25.93 KG/M2 | OXYGEN SATURATION: 99 % | HEART RATE: 124 BPM

## 2021-10-13 PROCEDURE — 99213 OFFICE O/P EST LOW 20 MIN: CPT

## 2021-10-13 RX ORDER — DICLOFENAC SODIUM 75 MG/1
75 TABLET, DELAYED RELEASE ORAL TWICE DAILY
Qty: 60 | Refills: 1 | Status: COMPLETED | COMMUNITY
Start: 2018-12-10 | End: 2021-10-13

## 2021-10-13 RX ORDER — SULFAMETHOXAZOLE AND TRIMETHOPRIM 800; 160 MG/1; MG/1
800-160 TABLET ORAL TWICE DAILY
Qty: 14 | Refills: 0 | Status: COMPLETED | COMMUNITY
Start: 2021-09-16 | End: 2021-10-13

## 2021-10-13 RX ORDER — METHYLPREDNISOLONE 4 MG/1
4 TABLET ORAL
Qty: 42 | Refills: 0 | Status: COMPLETED | COMMUNITY
Start: 2018-12-10 | End: 2021-10-13

## 2021-10-13 NOTE — HISTORY OF PRESENT ILLNESS
[FreeTextEntry1] : 56 year old male with history of prostate cancer s/p recent biopsy who presented to [facility] with ESBL E. coli UTI.  Was found to have prostate abscess.  He is currently on IV ertapenem x 6 weeks and has completed over 3 weeks.  He is tolerating the drug well without side effects.  Compliant with the medicine.  No fevers or urinary complaints.  \par \par  ID#  [id number]

## 2021-10-13 NOTE — PHYSICAL EXAM
[General Appearance - Alert] : alert [Sclera] : the sclera and conjunctiva were normal [Outer Ear] : the ears and nose were normal in appearance [Neck Appearance] : the appearance of the neck was normal [Heart Sounds] : normal S1 and S2 [Edema] : there was no peripheral edema [Bowel Sounds] : normal bowel sounds [No Palpable Adenopathy] : no palpable adenopathy [Musculoskeletal - Swelling] : no joint swelling [] : no rash [Motor Exam] : the motor exam was normal [Oriented To Time, Place, And Person] : oriented to person, place, and time

## 2021-10-15 ENCOUNTER — OUTPATIENT (OUTPATIENT)
Dept: OUTPATIENT SERVICES | Facility: HOSPITAL | Age: 56
LOS: 1 days | End: 2021-10-15
Payer: COMMERCIAL

## 2021-10-15 ENCOUNTER — RESULT REVIEW (OUTPATIENT)
Age: 56
End: 2021-10-15

## 2021-10-15 DIAGNOSIS — C67.0 MALIGNANT NEOPLASM OF TRIGONE OF BLADDER: ICD-10-CM

## 2021-10-15 PROCEDURE — 88321 CONSLTJ&REPRT SLD PREP ELSWR: CPT

## 2021-10-19 LAB — SURGICAL PATHOLOGY STUDY: SIGNIFICANT CHANGE UP

## 2021-11-01 ENCOUNTER — OUTPATIENT (OUTPATIENT)
Dept: OUTPATIENT SERVICES | Facility: HOSPITAL | Age: 56
LOS: 1 days | End: 2021-11-01
Payer: MEDICAID

## 2021-11-01 ENCOUNTER — OUTPATIENT (OUTPATIENT)
Dept: OUTPATIENT SERVICES | Facility: HOSPITAL | Age: 56
LOS: 1 days | End: 2021-11-01

## 2021-11-02 ENCOUNTER — APPOINTMENT (OUTPATIENT)
Dept: CT IMAGING | Facility: CLINIC | Age: 56
End: 2021-11-02
Payer: MEDICAID

## 2021-11-02 ENCOUNTER — OUTPATIENT (OUTPATIENT)
Dept: OUTPATIENT SERVICES | Facility: HOSPITAL | Age: 56
LOS: 1 days | End: 2021-11-02

## 2021-11-02 ENCOUNTER — RESULT REVIEW (OUTPATIENT)
Age: 56
End: 2021-11-02

## 2021-11-02 PROCEDURE — 74178 CT ABD&PLV WO CNTR FLWD CNTR: CPT | Mod: 26

## 2021-11-03 ENCOUNTER — NON-APPOINTMENT (OUTPATIENT)
Age: 56
End: 2021-11-03

## 2021-11-06 DIAGNOSIS — Z01.818 ENCOUNTER FOR OTHER PREPROCEDURAL EXAMINATION: ICD-10-CM

## 2021-11-08 ENCOUNTER — APPOINTMENT (OUTPATIENT)
Dept: INFECTIOUS DISEASE | Facility: CLINIC | Age: 56
End: 2021-11-08
Payer: MEDICAID

## 2021-11-08 VITALS
DIASTOLIC BLOOD PRESSURE: 103 MMHG | SYSTOLIC BLOOD PRESSURE: 174 MMHG | BODY MASS INDEX: 26.22 KG/M2 | HEIGHT: 68 IN | WEIGHT: 173 LBS | OXYGEN SATURATION: 99 % | HEART RATE: 123 BPM | TEMPERATURE: 97.9 F

## 2021-11-08 DIAGNOSIS — N41.2 ABSCESS OF PROSTATE: ICD-10-CM

## 2021-11-08 PROCEDURE — 99213 OFFICE O/P EST LOW 20 MIN: CPT

## 2021-11-08 NOTE — PHYSICAL EXAM
[General Appearance - Alert] : alert [Sclera] : the sclera and conjunctiva were normal [Outer Ear] : the ears and nose were normal in appearance [Neck Appearance] : the appearance of the neck was normal [Heart Rate And Rhythm] : heart rate was normal and rhythm regular [Bowel Sounds] : normal bowel sounds [No Palpable Adenopathy] : no palpable adenopathy [Musculoskeletal - Swelling] : no joint swelling [] : no rash [Motor Exam] : the motor exam was normal [Oriented To Time, Place, And Person] : oriented to person, place, and time

## 2021-11-08 NOTE — REASON FOR VISIT
[Follow-Up: _____] : a [unfilled] follow-up visit [[employer] ] : provided by [employer]   [Interpreters_IDNumber] : [id number] [Interpreters_FullName] : Erika [[email or web address]] : [place]

## 2021-11-08 NOTE — HISTORY OF PRESENT ILLNESS
[FreeTextEntry1] : 56 year old male with history of prostate cancer s/p recent biopsy who presented to [facility] with ESBL E. coli UTI.  Was found to have prostate abscess.  He was treated with IV ertapenem x 6 weeks, end date 11/3. Repeat CT abd/pelvis done prior to discontinuing antibiotics did not show prostate abscess.  Plan is for prostate surgery on 11/15 with Dr. Michael. He presents today for evaluation off antibiotics. He denies dysuria, pelvic/back pain. \par

## 2021-11-11 ENCOUNTER — NON-APPOINTMENT (OUTPATIENT)
Age: 56
End: 2021-11-11

## 2021-11-11 LAB
APPEARANCE: ABNORMAL
BACTERIA UR CULT: NORMAL
BACTERIA: NEGATIVE
BILIRUBIN URINE: NEGATIVE
BLOOD URINE: NEGATIVE
COLOR: YELLOW
GLUCOSE QUALITATIVE U: NEGATIVE
HYALINE CASTS: 0 /LPF
KETONES URINE: NEGATIVE
LEUKOCYTE ESTERASE URINE: NEGATIVE
MICROSCOPIC-UA: NORMAL
NITRITE URINE: NEGATIVE
PH URINE: 5.5
PROTEIN URINE: NORMAL
RED BLOOD CELLS URINE: 1 /HPF
SPECIFIC GRAVITY URINE: 1.02
SQUAMOUS EPITHELIAL CELLS: 0 /HPF
UROBILINOGEN URINE: NORMAL
WHITE BLOOD CELLS URINE: 0 /HPF

## 2021-11-12 ENCOUNTER — APPOINTMENT (OUTPATIENT)
Dept: DISASTER EMERGENCY | Facility: CLINIC | Age: 56
End: 2021-11-12

## 2021-11-12 VITALS
RESPIRATION RATE: 16 BRPM | DIASTOLIC BLOOD PRESSURE: 79 MMHG | TEMPERATURE: 98 F | WEIGHT: 173.06 LBS | SYSTOLIC BLOOD PRESSURE: 165 MMHG | OXYGEN SATURATION: 98 % | HEIGHT: 68 IN | HEART RATE: 78 BPM

## 2021-11-13 LAB — SARS-COV-2 N GENE NPH QL NAA+PROBE: NOT DETECTED

## 2021-11-15 ENCOUNTER — RESULT REVIEW (OUTPATIENT)
Age: 56
End: 2021-11-15

## 2021-11-15 ENCOUNTER — APPOINTMENT (OUTPATIENT)
Dept: UROLOGY | Facility: HOSPITAL | Age: 56
End: 2021-11-15

## 2021-11-15 ENCOUNTER — INPATIENT (INPATIENT)
Facility: HOSPITAL | Age: 56
LOS: 1 days | Discharge: HOME HEALTH | DRG: 708 | End: 2021-11-17
Attending: UROLOGY | Admitting: UROLOGY
Payer: COMMERCIAL

## 2021-11-15 DIAGNOSIS — C61 MALIGNANT NEOPLASM OF PROSTATE: ICD-10-CM

## 2021-11-15 PROCEDURE — 38571 LAPAROSCOPY LYMPHADENECTOMY: CPT | Mod: AS

## 2021-11-15 PROCEDURE — 88309 TISSUE EXAM BY PATHOLOGIST: CPT | Mod: 26

## 2021-11-15 PROCEDURE — 38571 LAPAROSCOPY LYMPHADENECTOMY: CPT

## 2021-11-15 PROCEDURE — 55866 LAPS SURG PRST8ECT RPBIC RAD: CPT | Mod: AS

## 2021-11-15 PROCEDURE — 55866 LAPS SURG PRST8ECT RPBIC RAD: CPT

## 2021-11-15 PROCEDURE — 88305 TISSUE EXAM BY PATHOLOGIST: CPT | Mod: 26

## 2021-11-15 RX ORDER — HYDROMORPHONE HYDROCHLORIDE 2 MG/ML
0.5 INJECTION INTRAMUSCULAR; INTRAVENOUS; SUBCUTANEOUS ONCE
Refills: 0 | Status: DISCONTINUED | OUTPATIENT
Start: 2021-11-15 | End: 2021-11-15

## 2021-11-15 RX ORDER — HEPARIN SODIUM 5000 [USP'U]/ML
5000 INJECTION INTRAVENOUS; SUBCUTANEOUS ONCE
Refills: 0 | Status: COMPLETED | OUTPATIENT
Start: 2021-11-15 | End: 2021-11-15

## 2021-11-15 RX ORDER — GABAPENTIN 400 MG/1
300 CAPSULE ORAL ONCE
Refills: 0 | Status: DISCONTINUED | OUTPATIENT
Start: 2021-11-15 | End: 2021-11-15

## 2021-11-15 RX ORDER — ACETAMINOPHEN 500 MG
1000 TABLET ORAL EVERY 6 HOURS
Refills: 0 | Status: COMPLETED | OUTPATIENT
Start: 2021-11-15 | End: 2021-11-16

## 2021-11-15 RX ORDER — OXYBUTYNIN CHLORIDE 5 MG
5 TABLET ORAL EVERY 8 HOURS
Refills: 0 | Status: DISCONTINUED | OUTPATIENT
Start: 2021-11-15 | End: 2021-11-17

## 2021-11-15 RX ORDER — GABAPENTIN 400 MG/1
300 CAPSULE ORAL ONCE
Refills: 0 | Status: COMPLETED | OUTPATIENT
Start: 2021-11-15 | End: 2021-11-15

## 2021-11-15 RX ORDER — ACETAMINOPHEN 500 MG
1000 TABLET ORAL ONCE
Refills: 0 | Status: COMPLETED | OUTPATIENT
Start: 2021-11-15 | End: 2021-11-15

## 2021-11-15 RX ORDER — ONDANSETRON 8 MG/1
4 TABLET, FILM COATED ORAL EVERY 6 HOURS
Refills: 0 | Status: DISCONTINUED | OUTPATIENT
Start: 2021-11-15 | End: 2021-11-17

## 2021-11-15 RX ORDER — INFLUENZA VIRUS VACCINE 15; 15; 15; 15 UG/.5ML; UG/.5ML; UG/.5ML; UG/.5ML
0.5 SUSPENSION INTRAMUSCULAR ONCE
Refills: 0 | Status: DISCONTINUED | OUTPATIENT
Start: 2021-11-15 | End: 2021-11-17

## 2021-11-15 RX ORDER — TELMISARTAN 20 MG/1
1 TABLET ORAL
Qty: 0 | Refills: 0 | DISCHARGE

## 2021-11-15 RX ORDER — OXYCODONE HYDROCHLORIDE 5 MG/1
10 TABLET ORAL EVERY 4 HOURS
Refills: 0 | Status: DISCONTINUED | OUTPATIENT
Start: 2021-11-15 | End: 2021-11-17

## 2021-11-15 RX ORDER — ACETAMINOPHEN 500 MG
1000 TABLET ORAL ONCE
Refills: 0 | Status: DISCONTINUED | OUTPATIENT
Start: 2021-11-15 | End: 2021-11-15

## 2021-11-15 RX ORDER — LIDOCAINE 4 G/100G
1 CREAM TOPICAL EVERY 24 HOURS
Refills: 0 | Status: DISCONTINUED | OUTPATIENT
Start: 2021-11-15 | End: 2021-11-17

## 2021-11-15 RX ORDER — OXYCODONE HYDROCHLORIDE 5 MG/1
5 TABLET ORAL EVERY 6 HOURS
Refills: 0 | Status: DISCONTINUED | OUTPATIENT
Start: 2021-11-15 | End: 2021-11-17

## 2021-11-15 RX ORDER — SODIUM CHLORIDE 9 MG/ML
1000 INJECTION INTRAMUSCULAR; INTRAVENOUS; SUBCUTANEOUS
Refills: 0 | Status: DISCONTINUED | OUTPATIENT
Start: 2021-11-15 | End: 2021-11-17

## 2021-11-15 RX ORDER — ERTAPENEM SODIUM 1 G/1
1000 INJECTION, POWDER, LYOPHILIZED, FOR SOLUTION INTRAMUSCULAR; INTRAVENOUS ONCE
Refills: 0 | Status: COMPLETED | OUTPATIENT
Start: 2021-11-15 | End: 2021-11-16

## 2021-11-15 RX ORDER — ENOXAPARIN SODIUM 100 MG/ML
40 INJECTION SUBCUTANEOUS ONCE
Refills: 0 | Status: DISCONTINUED | OUTPATIENT
Start: 2021-11-15 | End: 2021-11-15

## 2021-11-15 RX ADMIN — LIDOCAINE 1 PATCH: 4 CREAM TOPICAL at 21:23

## 2021-11-15 RX ADMIN — HYDROMORPHONE HYDROCHLORIDE 0.5 MILLIGRAM(S): 2 INJECTION INTRAMUSCULAR; INTRAVENOUS; SUBCUTANEOUS at 20:45

## 2021-11-15 RX ADMIN — Medication 1000 MILLIGRAM(S): at 10:32

## 2021-11-15 RX ADMIN — SODIUM CHLORIDE 125 MILLILITER(S): 9 INJECTION INTRAMUSCULAR; INTRAVENOUS; SUBCUTANEOUS at 20:32

## 2021-11-15 RX ADMIN — GABAPENTIN 300 MILLIGRAM(S): 400 CAPSULE ORAL at 10:24

## 2021-11-15 RX ADMIN — HEPARIN SODIUM 5000 UNIT(S): 5000 INJECTION INTRAVENOUS; SUBCUTANEOUS at 10:25

## 2021-11-15 RX ADMIN — Medication 1000 MILLIGRAM(S): at 10:24

## 2021-11-15 RX ADMIN — HYDROMORPHONE HYDROCHLORIDE 0.5 MILLIGRAM(S): 2 INJECTION INTRAMUSCULAR; INTRAVENOUS; SUBCUTANEOUS at 20:32

## 2021-11-16 ENCOUNTER — TRANSCRIPTION ENCOUNTER (OUTPATIENT)
Age: 56
End: 2021-11-16

## 2021-11-16 LAB
ANION GAP SERPL CALC-SCNC: 9 MMOL/L — SIGNIFICANT CHANGE UP (ref 5–17)
BUN SERPL-MCNC: 16 MG/DL — SIGNIFICANT CHANGE UP (ref 7–23)
CALCIUM SERPL-MCNC: 8.3 MG/DL — LOW (ref 8.4–10.5)
CHLORIDE SERPL-SCNC: 106 MMOL/L — SIGNIFICANT CHANGE UP (ref 96–108)
CO2 SERPL-SCNC: 24 MMOL/L — SIGNIFICANT CHANGE UP (ref 22–31)
COVID-19 NUCLEOCAPSID GAM AB INTERP: POSITIVE
COVID-19 NUCLEOCAPSID TOTAL GAM ANTIBODY RESULT: 6.22 INDEX — HIGH
COVID-19 SPIKE DOMAIN AB INTERP: POSITIVE
COVID-19 SPIKE DOMAIN ANTIBODY RESULT: >250 U/ML — HIGH
CREAT FLD-MCNC: 1.51 MG/DL — SIGNIFICANT CHANGE UP
CREAT SERPL-MCNC: 1 MG/DL — SIGNIFICANT CHANGE UP (ref 0.5–1.3)
GLUCOSE SERPL-MCNC: 143 MG/DL — HIGH (ref 70–99)
HCT VFR BLD CALC: 34.5 % — LOW (ref 39–50)
HGB BLD-MCNC: 11.2 G/DL — LOW (ref 13–17)
MCHC RBC-ENTMCNC: 27 PG — SIGNIFICANT CHANGE UP (ref 27–34)
MCHC RBC-ENTMCNC: 32.5 GM/DL — SIGNIFICANT CHANGE UP (ref 32–36)
MCV RBC AUTO: 83.1 FL — SIGNIFICANT CHANGE UP (ref 80–100)
NRBC # BLD: 0 /100 WBCS — SIGNIFICANT CHANGE UP (ref 0–0)
PLATELET # BLD AUTO: 214 K/UL — SIGNIFICANT CHANGE UP (ref 150–400)
POTASSIUM SERPL-MCNC: 4.1 MMOL/L — SIGNIFICANT CHANGE UP (ref 3.5–5.3)
POTASSIUM SERPL-SCNC: 4.1 MMOL/L — SIGNIFICANT CHANGE UP (ref 3.5–5.3)
RBC # BLD: 4.15 M/UL — LOW (ref 4.2–5.8)
RBC # FLD: 13.4 % — SIGNIFICANT CHANGE UP (ref 10.3–14.5)
SARS-COV-2 IGG+IGM SERPL QL IA: 6.22 INDEX — HIGH
SARS-COV-2 IGG+IGM SERPL QL IA: >250 U/ML — HIGH
SARS-COV-2 IGG+IGM SERPL QL IA: POSITIVE
SARS-COV-2 IGG+IGM SERPL QL IA: POSITIVE
SODIUM SERPL-SCNC: 139 MMOL/L — SIGNIFICANT CHANGE UP (ref 135–145)
SPECIMEN SOURCE FLD: SIGNIFICANT CHANGE UP
WBC # BLD: 11.46 K/UL — HIGH (ref 3.8–10.5)
WBC # FLD AUTO: 11.46 K/UL — HIGH (ref 3.8–10.5)

## 2021-11-16 RX ORDER — ENOXAPARIN SODIUM 100 MG/ML
40 INJECTION SUBCUTANEOUS DAILY
Refills: 0 | Status: DISCONTINUED | OUTPATIENT
Start: 2021-11-16 | End: 2021-11-17

## 2021-11-16 RX ORDER — LOSARTAN POTASSIUM 100 MG/1
100 TABLET, FILM COATED ORAL DAILY
Refills: 0 | Status: DISCONTINUED | OUTPATIENT
Start: 2021-11-16 | End: 2021-11-17

## 2021-11-16 RX ADMIN — LIDOCAINE 1 PATCH: 4 CREAM TOPICAL at 09:00

## 2021-11-16 RX ADMIN — Medication 1000 MILLIGRAM(S): at 06:14

## 2021-11-16 RX ADMIN — Medication 1000 MILLIGRAM(S): at 05:43

## 2021-11-16 RX ADMIN — SODIUM CHLORIDE 125 MILLILITER(S): 9 INJECTION INTRAMUSCULAR; INTRAVENOUS; SUBCUTANEOUS at 20:33

## 2021-11-16 RX ADMIN — ENOXAPARIN SODIUM 40 MILLIGRAM(S): 100 INJECTION SUBCUTANEOUS at 11:47

## 2021-11-16 RX ADMIN — LOSARTAN POTASSIUM 100 MILLIGRAM(S): 100 TABLET, FILM COATED ORAL at 07:50

## 2021-11-16 RX ADMIN — LIDOCAINE 1 PATCH: 4 CREAM TOPICAL at 06:32

## 2021-11-16 RX ADMIN — ERTAPENEM SODIUM 120 MILLIGRAM(S): 1 INJECTION, POWDER, LYOPHILIZED, FOR SOLUTION INTRAMUSCULAR; INTRAVENOUS at 23:57

## 2021-11-16 NOTE — DISCHARGE NOTE PROVIDER - NSDCMRMEDTOKEN_GEN_ALL_CORE_FT
telmisartan 80 mg oral tablet: 1 tab(s) orally once a day   Augmentin 500 mg-125 mg oral tablet: 1 cap(s) orally 2 times a day MDD:Please take one tab twice a day for 3 days  telmisartan 80 mg oral tablet: 1 tab(s) orally once a day

## 2021-11-16 NOTE — DISCHARGE NOTE PROVIDER - NSDCFUADDINST_GEN_ALL_CORE_FT
Continue clear diet until passing Gas or BM, Stay well hydrated, May shower. Keep dressings clean and Dry, can remove 24hrs after discharge. No strenuous activity until you speak with your Surgeon. Hold any aspirin, or anticoagulation therapy until speaking with your surgeon. Call MD if you have fever >100.4, nausea, vomiting, if laureano catheter not draining well, or for questions and to set up your follow up appointment. Laureano catheter/Leg bag care as instructed by nurses.  May resume home medication Telmisartan.   Follow up with Dr. Michael in one week for laureano catheter removal.  Start antibiotic prescribed one day prior to follow up appointment with Dr. Michael for laureano removal and continue for 3 days total.

## 2021-11-16 NOTE — DISCHARGE NOTE PROVIDER - NSDCCPCAREPLAN_GEN_ALL_CORE_FT
PRINCIPAL DISCHARGE DIAGNOSIS  Diagnosis: Cancer of prostate  Assessment and Plan of Treatment:

## 2021-11-16 NOTE — DISCHARGE NOTE PROVIDER - CARE PROVIDER_API CALL
Oni Michael)  Urology  [address]  [place], [place] [id number]  Phone: [phone]  Fax: [phone]  Follow Up Time:

## 2021-11-16 NOTE — DISCHARGE NOTE PROVIDER - HOSPITAL COURSE
57 yo male with CaP- s/p robotic prostatectomy (RALP) 11/16/2021. Tolerated procedure well. Uneventful post op course. Home with laureano to leg bag.   H/o ESBL E.Coli UTI after prostate biopsy requiring long term IV antibx. 55 yo male with CaP- s/p robotic prostatectomy (RALP) 11/16/2021. Tolerated procedure well. Uneventful post op course. Home with laureano to leg bag.   H/o ESBL E.Coli UTI after prostate biopsy requiring long term IV antibx. Pt is hemodynamically stable & optimized for discharge.

## 2021-11-17 ENCOUNTER — TRANSCRIPTION ENCOUNTER (OUTPATIENT)
Age: 56
End: 2021-11-17

## 2021-11-17 VITALS
DIASTOLIC BLOOD PRESSURE: 74 MMHG | TEMPERATURE: 98 F | HEART RATE: 92 BPM | OXYGEN SATURATION: 97 % | RESPIRATION RATE: 18 BRPM | SYSTOLIC BLOOD PRESSURE: 115 MMHG

## 2021-11-17 PROBLEM — I10 ESSENTIAL (PRIMARY) HYPERTENSION: Chronic | Status: ACTIVE | Noted: 2021-11-12

## 2021-11-17 LAB
ANION GAP SERPL CALC-SCNC: 10 MMOL/L — SIGNIFICANT CHANGE UP (ref 5–17)
BUN SERPL-MCNC: 11 MG/DL — SIGNIFICANT CHANGE UP (ref 7–23)
CALCIUM SERPL-MCNC: 8.8 MG/DL — SIGNIFICANT CHANGE UP (ref 8.4–10.5)
CHLORIDE SERPL-SCNC: 105 MMOL/L — SIGNIFICANT CHANGE UP (ref 96–108)
CO2 SERPL-SCNC: 26 MMOL/L — SIGNIFICANT CHANGE UP (ref 22–31)
CREAT FLD-MCNC: 1.18 MG/DL — SIGNIFICANT CHANGE UP
CREAT SERPL-MCNC: 1.1 MG/DL — SIGNIFICANT CHANGE UP (ref 0.5–1.3)
GLUCOSE SERPL-MCNC: 142 MG/DL — HIGH (ref 70–99)
HCT VFR BLD CALC: 35.9 % — LOW (ref 39–50)
HGB BLD-MCNC: 11.5 G/DL — LOW (ref 13–17)
MCHC RBC-ENTMCNC: 27.2 PG — SIGNIFICANT CHANGE UP (ref 27–34)
MCHC RBC-ENTMCNC: 32 GM/DL — SIGNIFICANT CHANGE UP (ref 32–36)
MCV RBC AUTO: 84.9 FL — SIGNIFICANT CHANGE UP (ref 80–100)
NRBC # BLD: 0 /100 WBCS — SIGNIFICANT CHANGE UP (ref 0–0)
PLATELET # BLD AUTO: 238 K/UL — SIGNIFICANT CHANGE UP (ref 150–400)
POTASSIUM SERPL-MCNC: 3.4 MMOL/L — LOW (ref 3.5–5.3)
POTASSIUM SERPL-SCNC: 3.4 MMOL/L — LOW (ref 3.5–5.3)
RBC # BLD: 4.23 M/UL — SIGNIFICANT CHANGE UP (ref 4.2–5.8)
RBC # FLD: 13.7 % — SIGNIFICANT CHANGE UP (ref 10.3–14.5)
SODIUM SERPL-SCNC: 141 MMOL/L — SIGNIFICANT CHANGE UP (ref 135–145)
WBC # BLD: 11.76 K/UL — HIGH (ref 3.8–10.5)
WBC # FLD AUTO: 11.76 K/UL — HIGH (ref 3.8–10.5)

## 2021-11-17 RX ADMIN — LOSARTAN POTASSIUM 100 MILLIGRAM(S): 100 TABLET, FILM COATED ORAL at 05:33

## 2021-11-17 RX ADMIN — ENOXAPARIN SODIUM 40 MILLIGRAM(S): 100 INJECTION SUBCUTANEOUS at 12:00

## 2021-11-17 NOTE — PROGRESS NOTE ADULT - ASSESSMENT
56M with PMH of ESBL E.coli UTI, low back pain and HTN s/p singe port prostatectomy. 
57yo male with PMH of prostate cancer, ESBLI E.coli UTI, HTN s/p single port RALP
57yo male with PMH of prostate cancer, ESBLI E.coli UTI, HTN s/p single port RALP

## 2021-11-17 NOTE — PROGRESS NOTE ADULT - SUBJECTIVE AND OBJECTIVE BOX
INTERVAL HPI/OVERNIGHT EVENTS:  No acute events overnight.    VITALS:    T(F): 98 (11-16-21 @ 05:07), Max: 98 (11-16-21 @ 05:07)  HR: 84 (11-16-21 @ 05:07) (84 - 99)  BP: 120/77 (11-16-21 @ 05:07) (110/55 - 132/62)  RR: 17 (11-16-21 @ 05:07) (13 - 22)  SpO2: 96% (11-16-21 @ 05:07) (95% - 100%)  Wt(kg): --    I&O's Detail    15 Nov 2021 07:01  -  16 Nov 2021 06:35  --------------------------------------------------------  IN:    Oral Fluid: 240 mL    sodium chloride 0.9%: 1375 mL  Total IN: 1615 mL    OUT:    Blood Loss (mL): 250 mL    Bulb (mL): 45 mL    Indwelling Catheter - Urethral (mL): 1000 mL    Voided (mL): 400 mL  Total OUT: 1695 mL    Total NET: -80 mL          MEDICATIONS:    ANTIBIOTICS:  ertapenem  IVPB 1000 milliGRAM(s) IV Intermittent once      PAIN CONTROL:  acetaminophen     Tablet .. 1000 milliGRAM(s) Oral every 6 hours  ondansetron Injectable 4 milliGRAM(s) IV Push every 6 hours PRN  oxyCODONE    IR 5 milliGRAM(s) Oral every 6 hours PRN  oxyCODONE    IR 10 milliGRAM(s) Oral every 4 hours PRN       MEDS:  oxybutynin 5 milliGRAM(s) Oral every 8 hours PRN      HEME/ONC        PHYSICAL EXAM:  General: No acute distress.  Alert and Oriented  Abdominal Exam: soft, appropriately tender, non-distended. incisions c/d/i   Exam: laureano with clear, yellow urine       LABS:                        11.2   11.46 )-----------( 214      ( 16 Nov 2021 05:59 )             34.5     11-16    139  |  106  |  16  ----------------------------<  143<H>  4.1   |  24  |  1.00    Ca    8.3<L>      16 Nov 2021 05:59        
   POST OP NOTE:  procedure: single port RALP  Patient spoken to using . Denies any acute complaints. Denies fevers, chills, SOB, nausea or vomiting.       11-15-21 @ 07:01  -  11-15-21 @ 23:03  --------------------------------------------------------  IN: 375 mL / OUT: 1015 mL / NET: -640 mL      T(C): 36.4 (11-15-21 @ 22:30), Max: 36.4 (11-15-21 @ 20:15)  HR: 92 (11-15-21 @ 22:30) (84 - 99)  BP: 121/68 (11-15-21 @ 22:30) (110/55 - 132/62)  RR: 18 (11-15-21 @ 22:30) (13 - 22)  SpO2: 100% (11-15-21 @ 22:00) (100% - 100%)    GEN: No acute distress, alert and oriented   ABD: Soft, appropriately tender, non-distended. incisions c/d/i. right abdomen JOHN PAUL with serosanguinous fluid   : laureano in place draining clear, yellow urine         
INTERVAL HPI/OVERNIGHT EVENTS:  No acute events overnight.    VITALS:    T(F): 98 (11-17-21 @ 05:07), Max: 98.8 (11-16-21 @ 20:40)  HR: 93 (11-17-21 @ 05:07) (86 - 93)  BP: 139/80 (11-17-21 @ 05:07) (109/71 - 156/86)  RR: 18 (11-17-21 @ 05:07) (16 - 18)  SpO2: 94% (11-17-21 @ 05:07) (94% - 97%)  Wt(kg): --    I&O's Detail    15 Nov 2021 07:01  -  16 Nov 2021 07:00  --------------------------------------------------------  IN:    Oral Fluid: 240 mL    sodium chloride 0.9%: 1500 mL  Total IN: 1740 mL    OUT:    Blood Loss (mL): 250 mL    Bulb (mL): 45 mL    Indwelling Catheter - Urethral (mL): 1000 mL    Voided (mL): 400 mL  Total OUT: 1695 mL    Total NET: 45 mL      16 Nov 2021 07:01  -  17 Nov 2021 05:20  --------------------------------------------------------  IN:    IV PiggyBack: 50 mL    Oral Fluid: 1010 mL    sodium chloride 0.9%: 2625 mL  Total IN: 3685 mL    OUT:    Blood Loss (mL): 5 mL    Bulb (mL): 5 mL    Indwelling Catheter - Urethral (mL): 4300 mL  Total OUT: 4310 mL    Total NET: -625 mL          MEDICATIONS:    ANTIBIOTICS:      PAIN CONTROL:  ondansetron Injectable 4 milliGRAM(s) IV Push every 6 hours PRN  oxyCODONE    IR 5 milliGRAM(s) Oral every 6 hours PRN  oxyCODONE    IR 10 milliGRAM(s) Oral every 4 hours PRN       MEDS:  oxybutynin 5 milliGRAM(s) Oral every 8 hours PRN      HEME/ONC  enoxaparin Injectable 40 milliGRAM(s) SubCutaneous daily        PHYSICAL EXAM:  General: No acute distress.  Alert and Oriented  Abdominal Exam: soft, appropriately tender, non-distended. incisions c/d/i   Exam: laureano with clear, yellow urine       LABS:                        11.2   11.46 )-----------( 214      ( 16 Nov 2021 05:59 )             34.5     11-16    139  |  106  |  16  ----------------------------<  143<H>  4.1   |  24  |  1.00    Ca    8.3<L>      16 Nov 2021 05:59

## 2021-11-17 NOTE — PROGRESS NOTE ADULT - PROBLEM SELECTOR PLAN 1
- continue laureano catheter  - abx: ertapenem   - diet: soft  - pain control  - OOB/IS   - SCDs.
- stable post op   - continue laureano catheter  - abx: ertapenem in OR   - diet: clears   - pain control  - OOB/IS   - SCDs
- stable post op   - continue laureano catheter  - abx: ertapenem in OR   - diet: clears   - pain control  - OOB/IS   - SCDs.

## 2021-11-17 NOTE — DISCHARGE NOTE NURSING/CASE MANAGEMENT/SOCIAL WORK - PATIENT PORTAL LINK FT
You can access the FollowMyHealth Patient Portal offered by [facility] by registering at the following website: [email or web address]. By joining [facility]’s FollowMyHealth portal, you will also be able to view your health information using other applications (apps) compatible with our system.

## 2021-11-18 DIAGNOSIS — Z71.89 OTHER SPECIFIED COUNSELING: ICD-10-CM

## 2021-11-22 LAB — SURGICAL PATHOLOGY STUDY: SIGNIFICANT CHANGE UP

## 2021-11-23 ENCOUNTER — APPOINTMENT (OUTPATIENT)
Dept: UROLOGY | Facility: CLINIC | Age: 56
End: 2021-11-23
Payer: MEDICAID

## 2021-11-23 VITALS
BODY MASS INDEX: 26.22 KG/M2 | HEIGHT: 68 IN | SYSTOLIC BLOOD PRESSURE: 128 MMHG | HEART RATE: 100 BPM | TEMPERATURE: 97.88 F | WEIGHT: 173 LBS | DIASTOLIC BLOOD PRESSURE: 87 MMHG

## 2021-11-23 PROCEDURE — 99024 POSTOP FOLLOW-UP VISIT: CPT

## 2021-11-23 NOTE — HISTORY OF PRESENT ILLNESS
[FreeTextEntry1] : 56 yr old male s/p RALP on 11/15/21\par \par Doing well post-op\par No fever, chills, nausea, vomiting\par Passing flatus, moving bowels\par No chest pain, no lower extremity swelling \par \par \par pathology reviewed\par \par path= GS 7 (3+4), pT2, N0, negative margins\par \par Kegel exercises reviewed \par \par Catheter and drain removed intake without difficulty\par \par Follow up with PSA in 6 weeks\par \par \par \par \par

## 2021-11-26 DIAGNOSIS — C61 MALIGNANT NEOPLASM OF PROSTATE: ICD-10-CM

## 2021-11-26 DIAGNOSIS — Z86.19 PERSONAL HISTORY OF OTHER INFECTIOUS AND PARASITIC DISEASES: ICD-10-CM

## 2021-11-26 DIAGNOSIS — Z87.440 PERSONAL HISTORY OF URINARY (TRACT) INFECTIONS: ICD-10-CM

## 2021-11-26 DIAGNOSIS — I10 ESSENTIAL (PRIMARY) HYPERTENSION: ICD-10-CM

## 2021-12-01 PROCEDURE — G9005: CPT

## 2021-12-07 PROCEDURE — 86769 SARS-COV-2 COVID-19 ANTIBODY: CPT

## 2021-12-07 PROCEDURE — 88309 TISSUE EXAM BY PATHOLOGIST: CPT

## 2021-12-07 PROCEDURE — 86850 RBC ANTIBODY SCREEN: CPT

## 2021-12-07 PROCEDURE — 86900 BLOOD TYPING SEROLOGIC ABO: CPT

## 2021-12-07 PROCEDURE — 88305 TISSUE EXAM BY PATHOLOGIST: CPT

## 2021-12-07 PROCEDURE — 80048 BASIC METABOLIC PNL TOTAL CA: CPT

## 2021-12-07 PROCEDURE — 85027 COMPLETE CBC AUTOMATED: CPT

## 2021-12-07 PROCEDURE — S2900: CPT

## 2021-12-07 PROCEDURE — 86901 BLOOD TYPING SEROLOGIC RH(D): CPT

## 2021-12-07 PROCEDURE — C9399: CPT

## 2021-12-07 PROCEDURE — C1889: CPT

## 2021-12-07 PROCEDURE — 36415 COLL VENOUS BLD VENIPUNCTURE: CPT

## 2021-12-07 PROCEDURE — 82570 ASSAY OF URINE CREATININE: CPT

## 2022-01-04 ENCOUNTER — APPOINTMENT (OUTPATIENT)
Dept: UROLOGY | Facility: CLINIC | Age: 57
End: 2022-01-04
Payer: MEDICAID

## 2022-01-04 VITALS
OXYGEN SATURATION: 99 % | DIASTOLIC BLOOD PRESSURE: 77 MMHG | HEART RATE: 81 BPM | TEMPERATURE: 208.94 F | SYSTOLIC BLOOD PRESSURE: 131 MMHG

## 2022-01-04 PROCEDURE — 99024 POSTOP FOLLOW-UP VISIT: CPT

## 2022-01-04 RX ORDER — TADALAFIL 5 MG/1
5 TABLET ORAL
Qty: 90 | Refills: 3 | Status: ACTIVE | COMMUNITY
Start: 2022-01-04 | End: 1900-01-01

## 2022-01-04 NOTE — PHYSICAL EXAM
[General Appearance - Well Developed] : well developed [General Appearance - Well Nourished] : well nourished [Normal Appearance] : normal appearance [Well Groomed] : well groomed [General Appearance - In No Acute Distress] : no acute distress [Edema] : no peripheral edema [] : no respiratory distress [Respiration, Rhythm And Depth] : normal respiratory rhythm and effort [Exaggerated Use Of Accessory Muscles For Inspiration] : no accessory muscle use [Abdomen Soft] : soft [Abdomen Tenderness] : non-tender [Costovertebral Angle Tenderness] : no ~M costovertebral angle tenderness [Urethral Meatus] : meatus normal [Urinary Bladder Findings] : the bladder was normal on palpation [Scrotum] : the scrotum was normal [Epididymis] : the epididymides were normal [Testes Tenderness] : no tenderness of the testes [Testes Mass (___cm)] : there were no testicular masses [No Focal Deficits] : no focal deficits [Oriented To Time, Place, And Person] : oriented to person, place, and time [Affect] : the affect was normal [Mood] : the mood was normal [Not Anxious] : not anxious [No Palpable Adenopathy] : no palpable adenopathy

## 2022-01-04 NOTE — LETTER BODY
[Dear  ___] : Dear  [unfilled], [Consult Letter:] : I had the pleasure of evaluating your patient, [unfilled]. [Please see my note below.] : Please see my note below. [Consult Closing:] : Thank you very much for allowing me to participate in the care of this patient.  If you have any questions, please do not hesitate to contact me. [Sincerely,] : Sincerely, [FreeTextEntry3] : Oni Michael MD, FRCS \par  of Urology [facility]\par Director of Laparoscopic and Robotic Surgery \par [facility] Director of Urology, [facility] \par Professor of Urology\par \par (Office) [phone]\par (Cell)  [phone] \par [email or web address]\par \par \par  [DrJeramy  ___] : Dr. STYLES

## 2022-01-04 NOTE — ASSESSMENT
[FreeTextEntry1] : History of prostate cancer \par -PSA today\par \par Excellent erection recovery at this point\par Daily Cialis and encouraged sexual activity\par \par Urinary control good/improving\par Continue Kegel\par PT pelvic floor\par \par I will see him in 3 months to ensure improving functionally, then he will follow up with you long term. \par \par Thank you so much for the opportunity to partake in the care of your patient. \par \par Oni Michael MD, FRCS \par  of Urology [facility]\par Director of Laparoscopic and Robotic Surgery \par [facility] Director of Urology, [facility] \par Professor of Urology\par \par (Office) [phone]\par (Cell)  [phone] \par [email or web address] \par \par \par

## 2022-01-04 NOTE — ADDENDUM
[FreeTextEntry1] : UROLOGY STAFF please send copy of pathology report 11/15/21 with letter/consult/note

## 2022-01-04 NOTE — HISTORY OF PRESENT ILLNESS
[FreeTextEntry1] : Dr. Orville Gallagher\par [address]\[place] [address]\par \par Dr. Elinor Kimball\par [facility]\par [address]\[place] [address]\par \par \par Dear Dr. Gallagher, \par \par I had the pleasure of seeing Walt for follow up.  As you know he is 56 yr old male with history of prostate abscess, and prostate cancer, and I performed robotic single port radical prostatectomy 11/15/21\par \par His pathology is favorable,  [place] 7 (3+4), pT2, N0, negative margins.  Postoperative PSA is pending (drawn today in office). \par \par Incidentally, found to have renal lesion. CTU 11/5/21: 2.6 cm hemorrhagic left renal cyst. Small indeterminate right renal lesion could not be characterized due to its small size and motion. 6 months follow-up contrast enhanced MRI  recommended in 6 months \par \par Incontinence improving; one pull up but changes some paper towel he places within.  It is clearly improving, and seems to be mild, mild/moderate.   Upon standing and examination, no leak.  When asked to cough, he leaked with cough.  \par \par Erections are quite good for this stage of recovery, with almost full erections in the morning. No Rx used so far for erections.  \par \par FAMHX: negative breast, ovarian, prostate \par SURGHX: spRALP\par MEDHX: HTN \par SOCIAL: , no children, nonsmoker \par ROS: Negative 10 system

## 2022-01-10 ENCOUNTER — NON-APPOINTMENT (OUTPATIENT)
Age: 57
End: 2022-01-10

## 2022-01-10 RX ORDER — SILDENAFIL 50 MG/1
50 TABLET ORAL
Qty: 18 | Refills: 10 | Status: ACTIVE | COMMUNITY
Start: 2022-01-10 | End: 1900-01-01

## 2022-01-11 LAB — PSA SERPL-MCNC: <0.01 NG/ML

## 2022-03-22 ENCOUNTER — APPOINTMENT (OUTPATIENT)
Dept: UROLOGY | Facility: CLINIC | Age: 57
End: 2022-03-22
Payer: MEDICAID

## 2022-03-22 PROCEDURE — 99214 OFFICE O/P EST MOD 30 MIN: CPT

## 2022-03-22 NOTE — ASSESSMENT
[FreeTextEntry1] : Doing well\par SHOBHA\par Follow up with Dr. Gallagher annually\par PSA annually\par Cialis trial hopefully he can get covered given expense\par \par \par Oni Michael MD, FRCS \par  of Urology [facility]\par Director of Laparoscopic and Robotic Surgery \par [facility] Director of Urology, [facility] \par Professor of Urology\par \par (Office) \par (Cell)  [phone] \par [email or web address]\par \par \par

## 2022-03-22 NOTE — HISTORY OF PRESENT ILLNESS
[FreeTextEntry1] : Dr. Orville Gallagher\par [address]\[place] [address]\par \par Dr. Elinor Kimball\par [facility]\par [address]\[place] [address]\par \par \par Dear Dr. Gallagher, \par \par 57 yr old male with history of prostate abscess, and prostate cancer\par Robotic single port radical prostatectomy 11/15/21\par \par [place] 7 (3+4), pT2, N0, negative margins\par PSA <0.01 ng/ml 2/11/2022 \par \par Incidentally, found to have renal lesion. CTU 11/5/21: 2.6 cm hemorrhagic left renal cyst. Small indeterminate right renal lesion could not be characterized due to its small size and motion. \par \par Urinary control is clearly improving; he is dry on exam and no staining of the pad placed this morning prior to his travel from [place].   Normal stream.  He is compliant with Kegel and PT.  He is close to being dry. \par \par Erections; he has not tried PDEI due to expense and lack of coverage \par \par FAMHX: negative breast, ovarian, prostate \par SURGHX: spRALP\par MEDHX: HTN \par SOCIAL: , no children, nonsmoker \par ROS: Negative 10 system \par

## 2022-03-22 NOTE — PHYSICAL EXAM
[General Appearance - Well Developed] : well developed [General Appearance - Well Nourished] : well nourished [Normal Appearance] : normal appearance [Well Groomed] : well groomed [General Appearance - In No Acute Distress] : no acute distress [Edema] : no peripheral edema [] : no respiratory distress [Respiration, Rhythm And Depth] : normal respiratory rhythm and effort [Exaggerated Use Of Accessory Muscles For Inspiration] : no accessory muscle use [Abdomen Soft] : soft [Abdomen Tenderness] : non-tender [Abdomen Hernia] : no hernia was discovered [Costovertebral Angle Tenderness] : no ~M costovertebral angle tenderness [Urethral Meatus] : meatus normal [Penis Abnormality] : normal uncircumcised penis [Urinary Bladder Findings] : the bladder was normal on palpation [Scrotum] : the scrotum was normal [Epididymis] : the epididymides were normal [Testes Tenderness] : no tenderness of the testes [Testes Mass (___cm)] : there were no testicular masses [No Prostate Nodules] : no prostate nodules [FreeTextEntry1] : dry on exam with cough  [No Focal Deficits] : no focal deficits [Oriented To Time, Place, And Person] : oriented to person, place, and time [Affect] : the affect was normal [Mood] : the mood was normal [Not Anxious] : not anxious [No Palpable Adenopathy] : no palpable adenopathy

## 2022-09-20 ENCOUNTER — APPOINTMENT (OUTPATIENT)
Dept: UROLOGY | Facility: CLINIC | Age: 57
End: 2022-09-20

## 2022-09-20 VITALS
HEART RATE: 92 BPM | HEIGHT: 67 IN | DIASTOLIC BLOOD PRESSURE: 95 MMHG | BODY MASS INDEX: 25.9 KG/M2 | SYSTOLIC BLOOD PRESSURE: 169 MMHG | TEMPERATURE: 209.12 F | WEIGHT: 165 LBS

## 2022-09-20 PROCEDURE — 99213 OFFICE O/P EST LOW 20 MIN: CPT

## 2022-09-20 RX ORDER — TADALAFIL 20 MG/1
20 TABLET ORAL
Qty: 20 | Refills: 0 | Status: ACTIVE | COMMUNITY
Start: 2022-03-22 | End: 1900-01-01

## 2022-09-20 NOTE — HISTORY OF PRESENT ILLNESS
[FreeTextEntry1] : Dr. Orville Gallagher\par [address]\[place] [address]\par \par Dr. Elinor Kimball\par [facility]\par [address]\[place] [address]\par \par \par \par CC: History of prostate cancer\par \par Single Port Radical prostatectomy on 11/15/2021\par \par [place] 7 (3+4), pT2, N0, negative margins\par \par No pads\par Small tissue in underwear \par \par Small amount of leakage if he is unable to use restroom while he is working, works maintenance at night \par \par PSA < 0.01 on 1/4/22\par \par Erections:\par No relations at this time, not having relations\par \par Tadalafil not covered by his insurance, unable to afford\par \par \par

## 2022-09-20 NOTE — PHYSICAL EXAM
[General Appearance - Well Developed] : well developed [General Appearance - Well Nourished] : well nourished [Normal Appearance] : normal appearance [Well Groomed] : well groomed [General Appearance - In No Acute Distress] : no acute distress [] : no respiratory distress [Respiration, Rhythm And Depth] : normal respiratory rhythm and effort [Exaggerated Use Of Accessory Muscles For Inspiration] : no accessory muscle use [Abdomen Soft] : soft [Abdomen Tenderness] : non-tender [Costovertebral Angle Tenderness] : no ~M costovertebral angle tenderness [Oriented To Time, Place, And Person] : oriented to person, place, and time [Affect] : the affect was normal [Mood] : the mood was normal [Not Anxious] : not anxious

## 2022-09-20 NOTE — ASSESSMENT
[FreeTextEntry1] : Diagnosis: History of Prostate Cancer\par \par Plan:\par PSA today\par Tadalafil 20 mg 3 x week, prescription to [facility]\par Continue with Kegels \par \par RTC in 6 months

## 2022-09-21 LAB — PSA, POST - PROSTATECTOMY: <0.01 NG/ML

## 2023-03-03 ENCOUNTER — NON-APPOINTMENT (OUTPATIENT)
Age: 58
End: 2023-03-03

## 2024-05-14 ENCOUNTER — APPOINTMENT (OUTPATIENT)
Dept: UROLOGY | Facility: CLINIC | Age: 59
End: 2024-05-14
Payer: COMMERCIAL

## 2024-05-14 VITALS
WEIGHT: 163 LBS | HEIGHT: 68 IN | BODY MASS INDEX: 24.71 KG/M2 | TEMPERATURE: 209.12 F | HEART RATE: 88 BPM | SYSTOLIC BLOOD PRESSURE: 146 MMHG | DIASTOLIC BLOOD PRESSURE: 73 MMHG | OXYGEN SATURATION: 99 %

## 2024-05-14 PROCEDURE — 99213 OFFICE O/P EST LOW 20 MIN: CPT

## 2024-05-14 RX ORDER — TADALAFIL 20 MG/1
20 TABLET ORAL
Qty: 20 | Refills: 3 | Status: ACTIVE | COMMUNITY
Start: 2024-05-14 | End: 1900-01-01

## 2024-05-14 NOTE — ASSESSMENT
[FreeTextEntry1] : DX: history of prostate cancer   Plan  PSA today and annual  Functionally doing well  Follow up with Dr. Gallagher annually for PSA   Oni Michael MD, FACS, FRCS  of Urology [facility] Director of Laparoscopic and Robotic Surgery [facility] Director of Urology, [facility] Professor of Urology   (Office) [phone] (Cell)  [phone] [email or web address]  The total amount of time I have personally spent preparing for this visit, reviewing the patient's test results, obtaining external history, ordering tests/medications, documenting clinical information, communicating with and counseling the patient/family and/or caregiver(s), and spent face to face with the patient explaining the above was minutes =20    [phone] [id number] '

## 2024-05-14 NOTE — HISTORY OF PRESENT ILLNESS
[FreeTextEntry1] : Dr. Orville Gallagher [address] [address]  Dr. Elinor Kimball [facility] [address] [place] [address]   CC: History of prostate cancer Single Port Radical prostatectomy on 11/15/2021 No pads; rare leak  [place] 7 (3+4), pT2, N0, negative margins Last PSA 9/2022 undetectable  PSA ordered/sent today  He states he gets erections but no semen

## 2024-06-05 LAB — PSA SERPL-MCNC: <0.01 NG/ML

## 2025-05-20 ENCOUNTER — APPOINTMENT (OUTPATIENT)
Dept: UROLOGY | Facility: CLINIC | Age: 60
End: 2025-05-20
Payer: COMMERCIAL

## 2025-05-20 ENCOUNTER — NON-APPOINTMENT (OUTPATIENT)
Age: 60
End: 2025-05-20

## 2025-05-20 VITALS
HEIGHT: 68 IN | DIASTOLIC BLOOD PRESSURE: 77 MMHG | HEART RATE: 102 BPM | OXYGEN SATURATION: 99 % | BODY MASS INDEX: 25.76 KG/M2 | SYSTOLIC BLOOD PRESSURE: 178 MMHG | TEMPERATURE: 209.3 F | WEIGHT: 170 LBS

## 2025-05-20 DIAGNOSIS — C61 MALIGNANT NEOPLASM OF PROSTATE: ICD-10-CM

## 2025-05-20 PROCEDURE — 99213 OFFICE O/P EST LOW 20 MIN: CPT

## 2025-05-20 RX ORDER — TADALAFIL 20 MG/1
20 TABLET ORAL
Qty: 30 | Refills: 6 | Status: ACTIVE | COMMUNITY
Start: 2025-05-20 | End: 1900-01-01

## 2025-05-21 LAB — PSA SERPL-MCNC: <0.01 NG/ML
